# Patient Record
Sex: MALE | Race: WHITE | NOT HISPANIC OR LATINO | Employment: OTHER | URBAN - METROPOLITAN AREA
[De-identification: names, ages, dates, MRNs, and addresses within clinical notes are randomized per-mention and may not be internally consistent; named-entity substitution may affect disease eponyms.]

---

## 2020-02-01 ENCOUNTER — APPOINTMENT (EMERGENCY)
Dept: CT IMAGING | Facility: HOSPITAL | Age: 80
DRG: 640 | End: 2020-02-01
Payer: MEDICARE

## 2020-02-01 ENCOUNTER — HOSPITAL ENCOUNTER (INPATIENT)
Facility: HOSPITAL | Age: 80
LOS: 3 days | Discharge: HOME | DRG: 640 | End: 2020-02-05
Attending: EMERGENCY MEDICINE | Admitting: INTERNAL MEDICINE
Payer: MEDICARE

## 2020-02-01 ENCOUNTER — APPOINTMENT (EMERGENCY)
Dept: RADIOLOGY | Facility: HOSPITAL | Age: 80
DRG: 640 | End: 2020-02-01
Payer: MEDICARE

## 2020-02-01 DIAGNOSIS — E87.6 HYPOKALEMIA: ICD-10-CM

## 2020-02-01 DIAGNOSIS — R60.9 EDEMA: ICD-10-CM

## 2020-02-01 DIAGNOSIS — N18.9 ACUTE-ON-CHRONIC KIDNEY INJURY (HCC): ICD-10-CM

## 2020-02-01 DIAGNOSIS — N17.9 ACUTE-ON-CHRONIC KIDNEY INJURY (HCC): ICD-10-CM

## 2020-02-01 DIAGNOSIS — R53.1 WEAKNESS: ICD-10-CM

## 2020-02-01 DIAGNOSIS — I10 HYPERTENSION: Chronic | ICD-10-CM

## 2020-02-01 DIAGNOSIS — E87.1 HYPONATREMIA: Primary | ICD-10-CM

## 2020-02-01 LAB
ALBUMIN SERPL BCP-MCNC: 3.4 G/DL (ref 3.5–5)
ALP SERPL-CCNC: 163 U/L (ref 46–116)
ALT SERPL W P-5'-P-CCNC: 30 U/L (ref 12–78)
ANION GAP SERPL CALCULATED.3IONS-SCNC: 10 MMOL/L (ref 4–13)
APTT PPP: 54 SECONDS (ref 23–37)
AST SERPL W P-5'-P-CCNC: 28 U/L (ref 5–45)
BASE EX.OXY STD BLDV CALC-SCNC: 70.3 % (ref 60–80)
BASE EXCESS BLDV CALC-SCNC: 0.1 MMOL/L
BASOPHILS # BLD AUTO: 0.04 THOUSANDS/ΜL (ref 0–0.1)
BASOPHILS NFR BLD AUTO: 1 % (ref 0–1)
BETA-HYDROXYBUTYRATE: 0.8 MMOL/L
BILIRUB SERPL-MCNC: 1.18 MG/DL (ref 0.2–1)
BUN SERPL-MCNC: 28 MG/DL (ref 5–25)
CALCIUM SERPL-MCNC: 9.4 MG/DL (ref 8.3–10.1)
CHLORIDE SERPL-SCNC: 81 MMOL/L (ref 100–108)
CK MB SERPL-MCNC: 2 NG/ML (ref 0–5)
CK MB SERPL-MCNC: <1 % (ref 0–2.5)
CK SERPL-CCNC: 244 U/L (ref 39–308)
CO2 SERPL-SCNC: 25 MMOL/L (ref 21–32)
CREAT SERPL-MCNC: 1.81 MG/DL (ref 0.6–1.3)
EOSINOPHIL # BLD AUTO: 0.17 THOUSAND/ΜL (ref 0–0.61)
EOSINOPHIL NFR BLD AUTO: 3 % (ref 0–6)
ERYTHROCYTE [DISTWIDTH] IN BLOOD BY AUTOMATED COUNT: 13 % (ref 11.6–15.1)
GFR SERPL CREATININE-BSD FRML MDRD: 35 ML/MIN/1.73SQ M
GLUCOSE SERPL-MCNC: 233 MG/DL (ref 65–140)
GLUCOSE SERPL-MCNC: 240 MG/DL (ref 65–140)
HCO3 BLDV-SCNC: 25.4 MMOL/L (ref 24–30)
HCT VFR BLD AUTO: 33.8 % (ref 36.5–49.3)
HGB BLD-MCNC: 12 G/DL (ref 12–17)
IMM GRANULOCYTES # BLD AUTO: 0.05 THOUSAND/UL (ref 0–0.2)
IMM GRANULOCYTES NFR BLD AUTO: 1 % (ref 0–2)
INR PPP: 2.08 (ref 0.84–1.19)
LYMPHOCYTES # BLD AUTO: 1.31 THOUSANDS/ΜL (ref 0.6–4.47)
LYMPHOCYTES NFR BLD AUTO: 20 % (ref 14–44)
MCH RBC QN AUTO: 32.7 PG (ref 26.8–34.3)
MCHC RBC AUTO-ENTMCNC: 35.5 G/DL (ref 31.4–37.4)
MCV RBC AUTO: 92 FL (ref 82–98)
MONOCYTES # BLD AUTO: 0.72 THOUSAND/ΜL (ref 0.17–1.22)
MONOCYTES NFR BLD AUTO: 11 % (ref 4–12)
NEUTROPHILS # BLD AUTO: 4.44 THOUSANDS/ΜL (ref 1.85–7.62)
NEUTS SEG NFR BLD AUTO: 64 % (ref 43–75)
NRBC BLD AUTO-RTO: 0 /100 WBCS
O2 CT BLDV-SCNC: 12.1 ML/DL
PCO2 BLDV: 43.9 MM HG (ref 42–50)
PH BLDV: 7.38 [PH] (ref 7.3–7.4)
PLATELET # BLD AUTO: 244 THOUSANDS/UL (ref 149–390)
PMV BLD AUTO: 9.1 FL (ref 8.9–12.7)
PO2 BLDV: 38.5 MM HG (ref 35–45)
POTASSIUM SERPL-SCNC: 3.2 MMOL/L (ref 3.5–5.3)
PROT SERPL-MCNC: 7.6 G/DL (ref 6.4–8.2)
PROTHROMBIN TIME: 22.5 SECONDS (ref 11.6–14.5)
RBC # BLD AUTO: 3.67 MILLION/UL (ref 3.88–5.62)
SODIUM SERPL-SCNC: 116 MMOL/L (ref 136–145)
TROPONIN I SERPL-MCNC: 0.02 NG/ML
TSH SERPL DL<=0.05 MIU/L-ACNC: 1.67 UIU/ML (ref 0.36–3.74)
WBC # BLD AUTO: 6.73 THOUSAND/UL (ref 4.31–10.16)

## 2020-02-01 PROCEDURE — 84443 ASSAY THYROID STIM HORMONE: CPT | Performed by: EMERGENCY MEDICINE

## 2020-02-01 PROCEDURE — 1123F ACP DISCUSS/DSCN MKR DOCD: CPT | Performed by: INTERNAL MEDICINE

## 2020-02-01 PROCEDURE — 71046 X-RAY EXAM CHEST 2 VIEWS: CPT

## 2020-02-01 PROCEDURE — 84484 ASSAY OF TROPONIN QUANT: CPT | Performed by: EMERGENCY MEDICINE

## 2020-02-01 PROCEDURE — 70450 CT HEAD/BRAIN W/O DYE: CPT

## 2020-02-01 PROCEDURE — 82948 REAGENT STRIP/BLOOD GLUCOSE: CPT

## 2020-02-01 PROCEDURE — 82805 BLOOD GASES W/O2 SATURATION: CPT | Performed by: EMERGENCY MEDICINE

## 2020-02-01 PROCEDURE — 80053 COMPREHEN METABOLIC PANEL: CPT | Performed by: EMERGENCY MEDICINE

## 2020-02-01 PROCEDURE — 85610 PROTHROMBIN TIME: CPT | Performed by: EMERGENCY MEDICINE

## 2020-02-01 PROCEDURE — 36415 COLL VENOUS BLD VENIPUNCTURE: CPT | Performed by: EMERGENCY MEDICINE

## 2020-02-01 PROCEDURE — 96360 HYDRATION IV INFUSION INIT: CPT

## 2020-02-01 PROCEDURE — 85025 COMPLETE CBC W/AUTO DIFF WBC: CPT | Performed by: EMERGENCY MEDICINE

## 2020-02-01 PROCEDURE — 82550 ASSAY OF CK (CPK): CPT | Performed by: EMERGENCY MEDICINE

## 2020-02-01 PROCEDURE — 82010 KETONE BODYS QUAN: CPT | Performed by: EMERGENCY MEDICINE

## 2020-02-01 PROCEDURE — 99291 CRITICAL CARE FIRST HOUR: CPT | Performed by: EMERGENCY MEDICINE

## 2020-02-01 PROCEDURE — 82553 CREATINE MB FRACTION: CPT | Performed by: EMERGENCY MEDICINE

## 2020-02-01 PROCEDURE — 85730 THROMBOPLASTIN TIME PARTIAL: CPT | Performed by: EMERGENCY MEDICINE

## 2020-02-01 PROCEDURE — 93005 ELECTROCARDIOGRAM TRACING: CPT

## 2020-02-01 PROCEDURE — 99285 EMERGENCY DEPT VISIT HI MDM: CPT

## 2020-02-01 RX ORDER — POTASSIUM CHLORIDE 20 MEQ/1
20 TABLET, EXTENDED RELEASE ORAL ONCE
Status: COMPLETED | OUTPATIENT
Start: 2020-02-01 | End: 2020-02-01

## 2020-02-01 RX ORDER — SODIUM CHLORIDE 9 MG/ML
125 INJECTION, SOLUTION INTRAVENOUS CONTINUOUS
Status: DISCONTINUED | OUTPATIENT
Start: 2020-02-01 | End: 2020-02-02

## 2020-02-01 RX ADMIN — POTASSIUM CHLORIDE 20 MEQ: 1500 TABLET, EXTENDED RELEASE ORAL at 23:36

## 2020-02-01 RX ADMIN — SODIUM CHLORIDE 250 ML: 0.9 INJECTION, SOLUTION INTRAVENOUS at 23:20

## 2020-02-01 RX ADMIN — SODIUM CHLORIDE 125 ML/HR: 0.9 INJECTION, SOLUTION INTRAVENOUS at 23:41

## 2020-02-02 PROBLEM — R53.1 GENERAL WEAKNESS: Status: ACTIVE | Noted: 2020-02-02

## 2020-02-02 PROBLEM — I48.91 ATRIAL FIBRILLATION (HCC): Chronic | Status: ACTIVE | Noted: 2020-02-02

## 2020-02-02 PROBLEM — N18.9 CKD (CHRONIC KIDNEY DISEASE): Chronic | Status: ACTIVE | Noted: 2020-02-02

## 2020-02-02 PROBLEM — E78.5 HYPERLIPEMIA: Chronic | Status: ACTIVE | Noted: 2020-02-02

## 2020-02-02 PROBLEM — E11.9 DM (DIABETES MELLITUS), TYPE 2 (HCC): Chronic | Status: ACTIVE | Noted: 2020-02-02

## 2020-02-02 PROBLEM — I34.0 MITRAL REGURGITATION: Chronic | Status: ACTIVE | Noted: 2020-02-02

## 2020-02-02 PROBLEM — I10 HYPERTENSION: Chronic | Status: ACTIVE | Noted: 2020-02-02

## 2020-02-02 PROBLEM — I25.10 CAD (CORONARY ARTERY DISEASE): Status: ACTIVE | Noted: 2020-02-02

## 2020-02-02 PROBLEM — E87.1 HYPONATREMIA: Status: ACTIVE | Noted: 2020-02-02

## 2020-02-02 PROBLEM — N18.9 CKD (CHRONIC KIDNEY DISEASE): Status: ACTIVE | Noted: 2020-02-02

## 2020-02-02 LAB
ANION GAP SERPL CALCULATED.3IONS-SCNC: 10 MMOL/L (ref 4–13)
ANION GAP SERPL CALCULATED.3IONS-SCNC: 10 MMOL/L (ref 4–13)
ANION GAP SERPL CALCULATED.3IONS-SCNC: 7 MMOL/L (ref 4–13)
ANION GAP SERPL CALCULATED.3IONS-SCNC: 8 MMOL/L (ref 4–13)
ANION GAP SERPL CALCULATED.3IONS-SCNC: 8 MMOL/L (ref 4–13)
ATRIAL RATE: 82 BPM
ATRIAL RATE: 89 BPM
BACTERIA UR QL AUTO: ABNORMAL /HPF
BILIRUB UR QL STRIP: NEGATIVE
BUN SERPL-MCNC: 25 MG/DL (ref 5–25)
BUN SERPL-MCNC: 26 MG/DL (ref 5–25)
CALCIUM SERPL-MCNC: 8.7 MG/DL (ref 8.3–10.1)
CALCIUM SERPL-MCNC: 8.8 MG/DL (ref 8.3–10.1)
CALCIUM SERPL-MCNC: 9.1 MG/DL (ref 8.3–10.1)
CALCIUM SERPL-MCNC: 9.2 MG/DL (ref 8.3–10.1)
CALCIUM SERPL-MCNC: 9.6 MG/DL (ref 8.3–10.1)
CHLORIDE SERPL-SCNC: 82 MMOL/L (ref 100–108)
CHLORIDE SERPL-SCNC: 84 MMOL/L (ref 100–108)
CHLORIDE SERPL-SCNC: 85 MMOL/L (ref 100–108)
CLARITY UR: CLEAR
CO2 SERPL-SCNC: 26 MMOL/L (ref 21–32)
CO2 SERPL-SCNC: 26 MMOL/L (ref 21–32)
CO2 SERPL-SCNC: 27 MMOL/L (ref 21–32)
CO2 SERPL-SCNC: 27 MMOL/L (ref 21–32)
CO2 SERPL-SCNC: 28 MMOL/L (ref 21–32)
COLOR UR: YELLOW
CREAT SERPL-MCNC: 1.62 MG/DL (ref 0.6–1.3)
CREAT SERPL-MCNC: 1.65 MG/DL (ref 0.6–1.3)
CREAT SERPL-MCNC: 1.65 MG/DL (ref 0.6–1.3)
CREAT SERPL-MCNC: 1.69 MG/DL (ref 0.6–1.3)
CREAT SERPL-MCNC: 1.77 MG/DL (ref 0.6–1.3)
GFR SERPL CREATININE-BSD FRML MDRD: 35 ML/MIN/1.73SQ M
GFR SERPL CREATININE-BSD FRML MDRD: 38 ML/MIN/1.73SQ M
GFR SERPL CREATININE-BSD FRML MDRD: 39 ML/MIN/1.73SQ M
GLUCOSE SERPL-MCNC: 151 MG/DL (ref 65–140)
GLUCOSE SERPL-MCNC: 156 MG/DL (ref 65–140)
GLUCOSE SERPL-MCNC: 163 MG/DL (ref 65–140)
GLUCOSE SERPL-MCNC: 173 MG/DL (ref 65–140)
GLUCOSE SERPL-MCNC: 174 MG/DL (ref 65–140)
GLUCOSE SERPL-MCNC: 176 MG/DL (ref 65–140)
GLUCOSE SERPL-MCNC: 203 MG/DL (ref 65–140)
GLUCOSE SERPL-MCNC: 226 MG/DL (ref 65–140)
GLUCOSE SERPL-MCNC: 246 MG/DL (ref 65–140)
GLUCOSE SERPL-MCNC: 258 MG/DL (ref 65–140)
GLUCOSE UR STRIP-MCNC: ABNORMAL MG/DL
HGB UR QL STRIP.AUTO: ABNORMAL
KETONES UR STRIP-MCNC: NEGATIVE MG/DL
LEUKOCYTE ESTERASE UR QL STRIP: NEGATIVE
NITRITE UR QL STRIP: NEGATIVE
NON-SQ EPI CELLS URNS QL MICRO: ABNORMAL /HPF
NT-PROBNP SERPL-MCNC: 8433 PG/ML
OSMOLALITY UR/SERPL-RTO: 259 MMOL/KG (ref 282–298)
OSMOLALITY UR: 395 MMOL/KG
PH UR STRIP.AUTO: 7 [PH] (ref 4.5–8)
PLATELET # BLD AUTO: 236 THOUSANDS/UL (ref 149–390)
PMV BLD AUTO: 8.6 FL (ref 8.9–12.7)
POTASSIUM SERPL-SCNC: 3.5 MMOL/L (ref 3.5–5.3)
POTASSIUM SERPL-SCNC: 3.5 MMOL/L (ref 3.5–5.3)
POTASSIUM SERPL-SCNC: 3.7 MMOL/L (ref 3.5–5.3)
POTASSIUM SERPL-SCNC: 3.8 MMOL/L (ref 3.5–5.3)
POTASSIUM SERPL-SCNC: 3.9 MMOL/L (ref 3.5–5.3)
PROT UR STRIP-MCNC: >=300 MG/DL
QRS AXIS: -79 DEGREES
QRS AXIS: -79 DEGREES
QRSD INTERVAL: 166 MS
QRSD INTERVAL: 172 MS
QT INTERVAL: 436 MS
QT INTERVAL: 450 MS
QTC INTERVAL: 516 MS
QTC INTERVAL: 528 MS
RBC #/AREA URNS AUTO: ABNORMAL /HPF
SODIUM 24H UR-SCNC: 83 MOL/L
SODIUM SERPL-SCNC: 116 MMOL/L (ref 136–145)
SODIUM SERPL-SCNC: 119 MMOL/L (ref 136–145)
SODIUM SERPL-SCNC: 119 MMOL/L (ref 136–145)
SODIUM SERPL-SCNC: 121 MMOL/L (ref 136–145)
SODIUM SERPL-SCNC: 123 MMOL/L (ref 136–145)
SP GR UR STRIP.AUTO: 1.02 (ref 1–1.03)
T WAVE AXIS: 94 DEGREES
T WAVE AXIS: 95 DEGREES
UROBILINOGEN UR QL STRIP.AUTO: 1 E.U./DL
VENTRICULAR RATE: 79 BPM
VENTRICULAR RATE: 88 BPM
WBC #/AREA URNS AUTO: ABNORMAL /HPF

## 2020-02-02 PROCEDURE — 99292 CRITICAL CARE ADDL 30 MIN: CPT | Performed by: INTERNAL MEDICINE

## 2020-02-02 PROCEDURE — 96361 HYDRATE IV INFUSION ADD-ON: CPT

## 2020-02-02 PROCEDURE — 81001 URINALYSIS AUTO W/SCOPE: CPT

## 2020-02-02 PROCEDURE — 99223 1ST HOSP IP/OBS HIGH 75: CPT | Performed by: INTERNAL MEDICINE

## 2020-02-02 PROCEDURE — 83935 ASSAY OF URINE OSMOLALITY: CPT | Performed by: PHYSICIAN ASSISTANT

## 2020-02-02 PROCEDURE — 82948 REAGENT STRIP/BLOOD GLUCOSE: CPT

## 2020-02-02 PROCEDURE — 99291 CRITICAL CARE FIRST HOUR: CPT | Performed by: PHYSICIAN ASSISTANT

## 2020-02-02 PROCEDURE — 85049 AUTOMATED PLATELET COUNT: CPT | Performed by: PHYSICIAN ASSISTANT

## 2020-02-02 PROCEDURE — 80048 BASIC METABOLIC PNL TOTAL CA: CPT | Performed by: PHYSICIAN ASSISTANT

## 2020-02-02 PROCEDURE — 83930 ASSAY OF BLOOD OSMOLALITY: CPT | Performed by: PHYSICIAN ASSISTANT

## 2020-02-02 PROCEDURE — 84300 ASSAY OF URINE SODIUM: CPT | Performed by: EMERGENCY MEDICINE

## 2020-02-02 PROCEDURE — 93010 ELECTROCARDIOGRAM REPORT: CPT | Performed by: INTERNAL MEDICINE

## 2020-02-02 PROCEDURE — 83880 ASSAY OF NATRIURETIC PEPTIDE: CPT | Performed by: PHYSICIAN ASSISTANT

## 2020-02-02 RX ORDER — CHLORTHALIDONE 25 MG/1
25 TABLET ORAL DAILY
COMMUNITY
End: 2020-02-05 | Stop reason: HOSPADM

## 2020-02-02 RX ORDER — METOPROLOL TARTRATE 100 MG/1
50 TABLET ORAL 2 TIMES DAILY
COMMUNITY
Start: 2019-02-28

## 2020-02-02 RX ORDER — FUROSEMIDE 10 MG/ML
40 INJECTION INTRAMUSCULAR; INTRAVENOUS ONCE
Status: COMPLETED | OUTPATIENT
Start: 2020-02-02 | End: 2020-02-02

## 2020-02-02 RX ORDER — 3% SODIUM CHLORIDE 3 G/100ML
30 INJECTION, SOLUTION INTRAVENOUS CONTINUOUS
Status: DISCONTINUED | OUTPATIENT
Start: 2020-02-02 | End: 2020-02-02

## 2020-02-02 RX ORDER — WARFARIN SODIUM 2.5 MG/1
TABLET ORAL
COMMUNITY
Start: 2018-03-26

## 2020-02-02 RX ORDER — METOPROLOL TARTRATE 100 MG/1
100 TABLET ORAL EVERY 12 HOURS SCHEDULED
Status: DISCONTINUED | OUTPATIENT
Start: 2020-02-02 | End: 2020-02-05 | Stop reason: HOSPADM

## 2020-02-02 RX ORDER — FLUTICASONE PROPIONATE 50 MCG
2 SPRAY, SUSPENSION (ML) NASAL DAILY
COMMUNITY
Start: 2020-01-25 | End: 2021-01-24

## 2020-02-02 RX ORDER — LANOLIN ALCOHOL/MO/W.PET/CERES
3 CREAM (GRAM) TOPICAL
Status: DISCONTINUED | OUTPATIENT
Start: 2020-02-02 | End: 2020-02-05 | Stop reason: HOSPADM

## 2020-02-02 RX ORDER — AZITHROMYCIN 250 MG/1
TABLET, FILM COATED ORAL
COMMUNITY
Start: 2020-01-29 | End: 2020-02-05 | Stop reason: HOSPADM

## 2020-02-02 RX ORDER — ATORVASTATIN CALCIUM 40 MG/1
40 TABLET, FILM COATED ORAL DAILY
COMMUNITY
Start: 2019-08-05

## 2020-02-02 RX ORDER — NIACIN 500 MG/1
TABLET, EXTENDED RELEASE ORAL
COMMUNITY
Start: 2017-11-28

## 2020-02-02 RX ORDER — NITROGLYCERIN 0.4 MG/1
0.4 TABLET SUBLINGUAL
COMMUNITY
Start: 2018-07-02

## 2020-02-02 RX ORDER — POTASSIUM CHLORIDE 20 MEQ/1
40 TABLET, EXTENDED RELEASE ORAL ONCE
Status: COMPLETED | OUTPATIENT
Start: 2020-02-02 | End: 2020-02-02

## 2020-02-02 RX ORDER — LOSARTAN POTASSIUM 100 MG/1
100 TABLET ORAL DAILY
Status: ON HOLD | COMMUNITY
Start: 2017-01-23 | End: 2020-02-05 | Stop reason: SDUPTHER

## 2020-02-02 RX ORDER — CHLORHEXIDINE GLUCONATE 0.12 MG/ML
15 RINSE ORAL EVERY 12 HOURS SCHEDULED
Status: DISCONTINUED | OUTPATIENT
Start: 2020-02-02 | End: 2020-02-03

## 2020-02-02 RX ORDER — AMLODIPINE BESYLATE 5 MG/1
5 TABLET ORAL DAILY
Status: DISCONTINUED | OUTPATIENT
Start: 2020-02-02 | End: 2020-02-05 | Stop reason: HOSPADM

## 2020-02-02 RX ORDER — HEPARIN SODIUM 5000 [USP'U]/ML
5000 INJECTION, SOLUTION INTRAVENOUS; SUBCUTANEOUS EVERY 8 HOURS SCHEDULED
Status: DISCONTINUED | OUTPATIENT
Start: 2020-02-02 | End: 2020-02-05

## 2020-02-02 RX ORDER — INSULIN LISPRO 100 [IU]/ML
INJECTION, SOLUTION INTRAVENOUS; SUBCUTANEOUS
COMMUNITY
Start: 2020-01-20

## 2020-02-02 RX ADMIN — METOPROLOL TARTRATE 100 MG: 100 TABLET, FILM COATED ORAL at 21:01

## 2020-02-02 RX ADMIN — CHLORHEXIDINE GLUCONATE 0.12% ORAL RINSE 15 ML: 1.2 LIQUID ORAL at 21:01

## 2020-02-02 RX ADMIN — INSULIN LISPRO 1 UNITS: 100 INJECTION, SOLUTION INTRAVENOUS; SUBCUTANEOUS at 09:08

## 2020-02-02 RX ADMIN — AMLODIPINE BESYLATE 5 MG: 5 TABLET ORAL at 09:09

## 2020-02-02 RX ADMIN — CHLORHEXIDINE GLUCONATE 0.12% ORAL RINSE 15 ML: 1.2 LIQUID ORAL at 09:09

## 2020-02-02 RX ADMIN — METOPROLOL TARTRATE 100 MG: 100 TABLET, FILM COATED ORAL at 03:33

## 2020-02-02 RX ADMIN — SODIUM CHLORIDE: 234 INJECTION INTRAMUSCULAR; INTRAVENOUS; SUBCUTANEOUS at 18:29

## 2020-02-02 RX ADMIN — HEPARIN SODIUM 5000 UNITS: 5000 INJECTION INTRAVENOUS; SUBCUTANEOUS at 05:29

## 2020-02-02 RX ADMIN — SODIUM CHLORIDE 30 ML/HR: 3 INJECTION, SOLUTION INTRAVENOUS at 04:42

## 2020-02-02 RX ADMIN — POTASSIUM CHLORIDE 40 MEQ: 1500 TABLET, EXTENDED RELEASE ORAL at 21:35

## 2020-02-02 RX ADMIN — FUROSEMIDE 40 MG: 10 INJECTION, SOLUTION INTRAMUSCULAR; INTRAVENOUS at 21:35

## 2020-02-02 RX ADMIN — INSULIN LISPRO 3 UNITS: 100 INJECTION, SOLUTION INTRAVENOUS; SUBCUTANEOUS at 21:01

## 2020-02-02 RX ADMIN — HEPARIN SODIUM 5000 UNITS: 5000 INJECTION INTRAVENOUS; SUBCUTANEOUS at 21:01

## 2020-02-02 RX ADMIN — INSULIN LISPRO 1 UNITS: 100 INJECTION, SOLUTION INTRAVENOUS; SUBCUTANEOUS at 17:26

## 2020-02-02 RX ADMIN — CHLORHEXIDINE GLUCONATE 0.12% ORAL RINSE 15 ML: 1.2 LIQUID ORAL at 03:33

## 2020-02-02 RX ADMIN — POTASSIUM CHLORIDE 40 MEQ: 1500 TABLET, EXTENDED RELEASE ORAL at 03:33

## 2020-02-02 RX ADMIN — FUROSEMIDE 40 MG: 10 INJECTION, SOLUTION INTRAMUSCULAR; INTRAVENOUS at 04:08

## 2020-02-02 RX ADMIN — INSULIN LISPRO 2 UNITS: 100 INJECTION, SOLUTION INTRAVENOUS; SUBCUTANEOUS at 12:06

## 2020-02-02 RX ADMIN — MELATONIN 3 MG: at 21:01

## 2020-02-02 RX ADMIN — HEPARIN SODIUM 5000 UNITS: 5000 INJECTION INTRAVENOUS; SUBCUTANEOUS at 13:37

## 2020-02-02 NOTE — H&P
H&P Exam - [address] [facility] [de-identified] y o  male MRN: [id number]  Unit/Bed#: ED 05 Encounter: [id number]      -------------------------------------------------------------------------------------------------------------  Chief Complaint: Worsening generalized weakness and weakness in legs    History of Present Illness     Amita Narayan is a [de-identified] y o  male with a PMH of DM2, CKD, HTN, CAD, afib on coumadin, mitral regurgitation that presented with worsening generalized weakness and weakness in his leg that he said became increasingly worse today  He stated that he recently began drinking more water  He drank 3-16oz bottles of water today which is more that he had previously  He denies visual changes, headaches, N/V/D, abdominal pain, chest pain, palpitation  He states that his hands are more swollen than normal and his daughter states that his hands and face are more swollen today than normal      History obtained from chart review and the patient   -------------------------------------------------------------------------------------------------------------  Assessment and Plan:    Neuro:    Diagnosis: Risk seizure secondary to hyponatremia  o Plan: Continue to monitor neurologic exam   Diagnosis: Monitor for ICU delirium  o Plan: CAM ICU  o Encourage sleep wake cycle       CV:    Diagnosis: Chronic atrial fibrillation on coumadin  o Plan: INR 2 08  Will need to restart coumadin   o Continue home metoprolol for rate control  He take 100mg BID   Diagnosis: Chronic HTN   o Plan: Continue home amolodipine  He takes 5mg daily   o He takes chlorthalidone at home  Will hold secondary to risk of hyponatremia with thiazide diuretics   The patient does appear volume overload but will treat will lasix    Diagnosis: CAD  o Plan: Continue ASA   Diagnosis: Mitral regurgitation   o Plan: Patient was to have NINI this month     Pulm:   Diagnosis: No acute issues  o Plan: Continue to encourage IS and pulmonary exercises  o Monitor on pulse oximetry  o Supplemental 02 as needed  o Goal Sp02>90%    GI:    Diagnosis: No acute issues      :    Diagnosis: CKD  o Plan: Baseline creatine 2-2 2  o Current creatine 1 8  o Continue to monitor I/O      F/E/N:    Diagnosis: Acute hyponatremia likely secondary to hypervolemia   o Plan: Patient received 250cc bolus of NS in the ED  o Will check NA now  o Check urine Na and osmolarity from previously collected sample in the ED  o TSH normal  o Will plan to give 40mg of lasix and monitor response   o Start 3% HTS at 30cc/hr  o Check Q4 Na  o Fluid restrict to 1L for now   Diagnosis: Hypokalemia  o Plan: Continue to trend  o Will give 40meq KCL    Heme/Onc:    Diagnosis: No acute issues    Endo:    Diagnosis: DM2  o Plan: Start ISS  o Hold home Levemir  Patient states that he was hypoglycemic earlier today because he had not been eating    o Will monitor for hypoglycemia     ID:    Diagnosis: No evidence of acute infection       MSK/Skin:    Diagnosis: Generalized weakness secondary to hyponatremia   o Plan: Ambulate       Disposition: Admit to Stepdown Level 1  Code Status: Level 1 - Full Code  --------------------------------------------------------------------------------------------------------------  Review of Systems    A 12-point, complete review of systems was reviewed and negative except as stated above     Physical Exam   Constitutional: He is oriented to person, place, and time  Elderly male lying in bed in no acute distress   HENT:   Periorbital edema   Eyes: Pupils are equal, round, and reactive to light  EOM are normal    Neck: Neck supple  Cardiovascular: Normal rate  An irregular rhythm present  2+ pretibial edema BL  BL hand edema   Pulmonary/Chest: Effort normal and breath sounds normal  No respiratory distress  Abdominal: Soft  He exhibits no distension  Musculoskeletal: Normal range of motion     Neurological: He is alert and oriented to person, place, and time  Skin: Skin is warm and dry  Capillary refill takes less than 2 seconds  Vitals reviewed  --------------------------------------------------------------------------------------------------------------  Vitals:   Vitals:    02/01/20 2154 02/01/20 2330 02/02/20 0030   BP: 162/82 152/78 (!) 171/88   BP Location: Right arm Left arm Left arm   Pulse: 64 70 74   Resp: 16 18 18   Temp: (!) 97 4 °F (36 3 °C)     TempSrc: Oral     SpO2: 98% 97% 97%   Weight: 95 2 kg (209 lb 14 1 oz)       Temp  Min: 97 4 °F (36 3 °C)  Max: 97 4 °F (36 3 °C)  IBW: -88 kg     There is no height or weight on file to calculate BMI  N/A    Laboratory and Diagnostics:  Results from last 7 days   Lab Units 02/01/20 2218   WBC Thousand/uL 6 73   HEMOGLOBIN g/dL 12 0   HEMATOCRIT % 33 8*   PLATELETS Thousands/uL 244   NEUTROS PCT % 64   MONOS PCT % 11     Results from last 7 days   Lab Units 02/01/20 2218   SODIUM mmol/L 116*   POTASSIUM mmol/L 3 2*   CHLORIDE mmol/L 81*   CO2 mmol/L 25   ANION GAP mmol/L 10   BUN mg/dL 28*   CREATININE mg/dL 1 81*   CALCIUM mg/dL 9 4   GLUCOSE RANDOM mg/dL 240*   ALT U/L 30   AST U/L 28   ALK PHOS U/L 163*   ALBUMIN g/dL 3 4*   TOTAL BILIRUBIN mg/dL 1 18*          Results from last 7 days   Lab Units 02/01/20 2218   INR  2 08*   PTT seconds 54*      Results from last 7 days   Lab Units 02/01/20 2218   TROPONIN I ng/mL 0 02         ABG:    VBG:  Results from last 7 days   Lab Units 02/01/20  2319   PH MIKE  7 380   PCO2 MIKE mm Hg 43 9   PO2 MIKE mm Hg 38 5   HCO3 MIKE mmol/L 25 4   BASE EXC MIKE mmol/L 0 1           Micro:        EKG: afib  Imaging: CT head imaging reviewed  No acute ICH or stroke   CXR shows evidence of volume overload per my reads    Historical Information   Past Medical History:   Diagnosis Date    Diabetes mellitus ([facility] [address] )     Hyperlipidemia     Hypertension      Past Surgical History:   Procedure Laterality Date    APPENDECTOMY      CARDIAC SURGERY       Social History Social History     Substance and Sexual Activity   Alcohol Use Not on file     Social History     Substance and Sexual Activity   Drug Use Not on file     Social History     Tobacco Use   Smoking Status Former Smoker    Types: Cigarettes    Last attempt to quit: 2000    Years since quittin 1   Smokeless Tobacco Never Used     Exercise History: ambulates at home  Family History:   History reviewed  No pertinent family history  I have reviewed this patient's family history and commented on sigificant items within the HPI      Medications:  Current Facility-Administered Medications   Medication Dose Route Frequency    chlorhexidine (PERIDEX) 0 12 % oral rinse 15 mL  15 mL Swish & Spit Q12H [facility]    heparin (porcine) subcutaneous injection 5,000 Units  5,000 Units Subcutaneous Q8H [facility]     Home medications:  None     Allergies:  No Known Allergies  ------------------------------------------------------------------------------------------------------------  Advance Directive and Living Will:      Power of :    POLST:    ------------------------------------------------------------------------------------------------------------  Anticipated Length of Stay is > 2 midnights    Counseling / Coordination of Care  Total Critical Care time spent 35 minutes excluding procedures, teaching and family updates  Tee Ngo PA-C        Portions of the record may have been created with voice recognition software  Occasional wrong word or "sound a like" substitutions may have occurred due to the inherent limitations of voice recognition software    Read the chart carefully and recognize, using context, where substitutions have occurred

## 2020-02-02 NOTE — CONSULTS
[address] [facility]oz 89 [de-identified] y o  male MRN: [id number]  Unit/Bed#: ICU 07 Encounter: [id number]    ASSESSMENT and PLAN:  1  Hyponatremia:  · Admission Na of 116 at 2218 on 2/1/20 and admitted to ICU  · He was treated with 3% saline and his Na this AM at 0745 was 123  · The change in the Na level is already at goal and I would recommend stopping the hypertonic saline  · For today, I would aim to keep his sodium level between 122 and 125  · Continue with BMP q4h  · Continue with fluid restriction 1000 cc/24 hours  · TSH is 1 674  SOsm 259  UOsm 395, [place] [address]  · Check cortisol level and uric acid  · Etiology is not entirely clear - He was on chlorthalidone and was drinking a higher than usual amount of water  Possibly fluid overload is playing a role? 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 although these are not entirely reliable due to the intake of thiazides  2  Chronic kidney disease, stage IV:  · Baseline serum creatinine is around 2 0-2 3  · Followed by Dr Uyen Carter of [address]  · Renal function is stable  3  Hypertension:  · BP is controlled  · Continue amlodipine and metoprolol    4  Edema:  · Continue to monitor for now  · Will eventually require loop diuretics but would hold off on it until Na is more stable  5  Hypokalemia:  · Improved  SUMMARY OF RECOMMENDATIONS:  · Stop hypertonic saline (discussed with medical resident earlier and already done)  · Continue to check BMP q4H  · Aim to keep Na between 122 and 125 through today  · Check cortisol and uric acid  · Continue fluid restriction  · Hold off on further diuretics until we know that Na is stable  The above plan was discussed with critical care  Previous records were personally reviewed by me to obtain a baseline creatinine  The images (CXR) were personally reviewed by me in PACS - no acute findings       HISTORY OF PRESENT ILLNESS:  Requesting Physician: Jarett Wills MD  Reason for Consult: Hyponatremia    Pratima Howard is a [de-identified] y o  male who was admitted to [facility] on 2/2/20 after presenting with weakness  A renal consultation is requested today for assistance in the management of hyponatremia  Parts of the information in this consult was obtained from the family as the patient is a poor historian  Wyelena Brown has a history of hypertension, diabetes mellitus, hyperlipidemia, chronic kidney disease, coronary artery disease, atrial fibrillation, hyperuricemia  He has known chronic kidney disease and follows with Dr Basim Faye of [address] [facility]  Based on my personal review of the records, I note that his baseline serum creatinine seems to be around 2 0-2 3  His most recent blood work as an outpatient was on December 10, 2019 which revealed a creatinine of 2 01 and a sodium level of 134  He now presents to [facility] after complaining of weakness which has been worsening over the past 2 weeks  He reports that he has had some balance issues over the past month but his leg weakness has worsened over the past 2 weeks  He does admit to drinking a little bit more water than usual   Based on my discussion with the family members, they reported the patient has been more forgetful over the past month  They also note that his balance is off but he has not had any falls  Additionally, they report leg, arm, and face swelling  Due to the worsening weakness, the patient came to [facility] and was found to have a serum sodium of 116  He was subsequently admitted to the intensive care unit and was started on 3% hypertonic saline  We are now being consulted for assistance with management of hyponatremia  I was called by the resident this morning about his repeat sodium being 123 and I recommended the discontinuation of hypertonic saline      Van Brown denies any poor oral intake, anorexia, fever, chills, vision changes, headaches, nausea, vomiting, abdominal pain, diarrhea, chest pain, shortness of breath, palpitations, joint pain  He was (+) for forgetfulness, ambulatory dysfunction, swelling, weakness  He reports being on chlorthalidone and the dose of such has not changed over the past few years  PAST MEDICAL HISTORY:  Past Medical History:   Diagnosis Date    Diabetes mellitus ([address] )     Hyperlipidemia     Hypertension      PAST SURGICAL HISTORY:  Past Surgical History:   Procedure Laterality Date    APPENDECTOMY      CARDIAC SURGERY       ALLERGIES:  No Known Allergies    SOCIAL HISTORY:  Social History     Substance and Sexual Activity   Alcohol Use Not on file     Social History     Substance and Sexual Activity   Drug Use Not on file     Social History     Tobacco Use   Smoking Status Former Smoker    Types: Cigarettes    Last attempt to quit:     Years since quittin 1   Smokeless Tobacco Never Used     FAMILY HISTORY:  History reviewed  No pertinent family history      MEDICATIONS:    Current Facility-Administered Medications:     amLODIPine (NORVASC) tablet 5 mg, 5 mg, Oral, Daily, Angella Kaminski PA-C, 5 mg at 20 0909    chlorhexidine (PERIDEX) 0 12 % oral rinse 15 mL, 15 mL, Swish & Spit, Q12H [facility], Angella Kaminski PA-C, 15 mL at 20 0909    heparin (porcine) subcutaneous injection 5,000 Units, 5,000 Units, Subcutaneous, Q8H [facility], 5,000 Units at 20 0529 **AND** [COMPLETED] Platelet count, , , Once, Angella Kaminski PA-C    insulin lispro (HumaLOG) 100 units/mL subcutaneous injection 1-6 Units, 1-6 Units, Subcutaneous, TID AC, 1 Units at 20 0908 **AND** Fingerstick Glucose (POCT), , , TID AC, Jarocho Coy PA-C    insulin lispro (HumaLOG) 100 units/mL subcutaneous injection 1-6 Units, 1-6 Units, Subcutaneous, HS, Angella Kaminski PA-C    metoprolol tartrate (LOPRESSOR) tablet 100 mg, 100 mg, Oral, Q12H [facility], Jarocho Coy PA-C, 100 mg at 20 8095    REVIEW OF SYSTEMS:  All the systems were reviewed and were negative except as documented on the HPI  PHYSICAL EXAM:  Current Weight: Weight - Scale: 93 kg (205 lb 0 4 oz)  First Weight: Weight - Scale: 95 2 kg (209 lb 14 1 oz)  Vitals:    02/02/20 0223 02/02/20 0300 02/02/20 0555 02/02/20 0700   BP: 149/72 144/70  118/71   BP Location: Left arm   Right arm   Pulse: 64 78  79   Resp: 18 (!) 27  14   Temp: (!) 97 4 °F (36 3 °C)   (!) 97 3 °F (36 3 °C)   TempSrc: Oral   Oral   SpO2: 96% 95%  93%   Weight:   93 kg (205 lb 0 4 oz)        Intake/Output Summary (Last 24 hours) at 2/2/2020 1202  Last data filed at 2/2/2020 1118  Gross per 24 hour   Intake 1110 ml   Output 1855 ml   Net -745 ml     Physical Exam  General: conscious, coherent, cooperative, not in distress  Skin: warm, dry, senile turgor  Eyes: pink conjunctivae, no scleral icterus  ENT: dry lips and mucous membranes  Neck: supple, difficult to evaluate JVP  Chest/Lungs: clear breath sounds  No rales, rhonchi, wheeze  CVS: distinct heart sounds, normal rate, regular rhythm  Abdomen: soft, non-tender, non-distended, normoactive bowel sounds  Extremities: (+) LE edema  : deferred   Neuro: awake, alert, forgetful  Psych: appropriate affect        Invasive Devices:      Lab Results:   Results from last 7 days   Lab Units 02/02/20  0745 02/02/20  0254 02/01/20  2218   WBC Thousand/uL  --   --  6 73   HEMOGLOBIN g/dL  --   --  12 0   HEMATOCRIT %  --   --  33 8*   PLATELETS Thousands/uL  --  236 244   POTASSIUM mmol/L 3 7 3 5 3 2*   CHLORIDE mmol/L 85* 82* 81*   CO2 mmol/L 28 26 25   BUN mg/dL 26* 26* 28*   CREATININE mg/dL 1 69* 1 77* 1 81*   CALCIUM mg/dL 8 7 9 6 9 4   ALK PHOS U/L  --   --  163*   ALT U/L  --   --  30   AST U/L  --   --  28     Other Studies:

## 2020-02-02 NOTE — ED PROVIDER NOTES
History  Chief Complaint   Patient presents with    Weakness - Generalized     Pt presents to the ED with generalized weakness  Patient is a [de-identified]year old male with about 1 week of generalized weakness and decreased appetite  No chest pain or sob  No headache  No N/V/D  No urinary sx  No fever  No GI bleeding  Was on zithromax last week for a cough  Is on coumadin for atrial fibrillation  No recent old records from this ED seen on computer system  [facility] website checked on this patient and no Rx found  History provided by:  Patient and relative (son and daughter)   used: No        None       Past Medical History:   Diagnosis Date    Diabetes mellitus ([facility] [address] )     Hyperlipidemia     Hypertension        Past Surgical History:   Procedure Laterality Date    APPENDECTOMY      CARDIAC SURGERY         History reviewed  No pertinent family history  I have reviewed and agree with the history as documented  Social History     Tobacco Use    Smoking status: Former Smoker     Types: Cigarettes     Last attempt to quit: 2000     Years since quittin 1    Smokeless tobacco: Never Used   Substance Use Topics    Alcohol use: Not on file    Drug use: Not on file        Review of Systems   Constitutional: Positive for appetite change and fatigue  Negative for fever  Respiratory: Negative for shortness of breath  Cardiovascular: Negative for chest pain  Gastrointestinal: Negative for blood in stool, diarrhea, nausea and vomiting  Genitourinary: Negative for difficulty urinating  Neurological: Positive for weakness  Negative for headaches  All other systems reviewed and are negative  Physical Exam  Physical Exam   Constitutional: He is oriented to person, place, and time  He appears well-developed and well-nourished  He appears distressed (moderate)  HENT:   Head: Normocephalic and atraumatic  Mucous membranes somewhat dry      Eyes: Pupils are equal, round, and reactive to light  EOM are normal  No scleral icterus  Neck: Normal range of motion  Neck supple  No JVD present  No tracheal deviation present  Cardiovascular: Normal rate  Murmur heard  Irregularly irregular   Pulmonary/Chest: Effort normal and breath sounds normal  No stridor  No respiratory distress  He has no wheezes  He has no rales  Abdominal: Soft  Bowel sounds are normal  There is no tenderness  Musculoskeletal: He exhibits edema (mild bilateral LE edema)  He exhibits no tenderness (No calf tenderness) or deformity  Neurological: He is alert and oriented to person, place, and time  No cranial nerve deficit or sensory deficit  Normal strength bilaterally  Skin: Skin is warm and dry  No rash noted  Psychiatric: He has a normal mood and affect  Nursing note and vitals reviewed        Vital Signs  ED Triage Vitals   Temperature Pulse Respirations Blood Pressure SpO2   02/01/20 2154 02/01/20 2154 02/01/20 2154 02/01/20 2154 02/01/20 2154   (!) 97 4 °F (36 3 °C) 64 16 162/82 98 %      Temp Source Heart Rate Source Patient Position - Orthostatic VS BP Location FiO2 (%)   02/01/20 2154 02/01/20 2154 02/01/20 2330 02/01/20 2154 --   Oral Monitor Sitting Right arm       Pain Score       02/02/20 0030       No Pain           Vitals:    02/01/20 2154 02/01/20 2330 02/02/20 0030   BP: 162/82 152/78 (!) 171/88   Pulse: 64 70 74   Patient Position - Orthostatic VS:  Sitting Sitting         Visual Acuity      ED Medications  Medications   chlorhexidine (PERIDEX) 0 12 % oral rinse 15 mL (has no administration in time range)   heparin (porcine) subcutaneous injection 5,000 Units (has no administration in time range)   sodium chloride 0 9 % bolus 250 mL (0 mL Intravenous Stopped 2/1/20 2341)   potassium chloride (K-DUR,KLOR-CON) CR tablet 20 mEq (20 mEq Oral Given 2/1/20 2336)       Diagnostic Studies  Results Reviewed     Procedure Component Value Units Date/Time    Platelet count [[id number]]     Lab Status:  No result Specimen:  Blood     Sodium, urine, random [[id number]]     Lab Status:  No result Specimen:  Urine     Osmolality, urine [[id number]]     Lab Status:  No result Specimen:  Urine     Basic metabolic panel [[id number]]     Lab Status:  No result Specimen:  Blood     Osmolality-"If this is regarding a toxic alcohol, STOP  Test is not routinely indicated   Please consult medical  on call for further guidance " [[id number]]     Lab Status:  No result Specimen:  Blood     Urine Microscopic [[id number]]  (Abnormal) Collected:  02/02/20 0033    Lab Status:  Final result Specimen:  Urine, Clean Catch Updated:  02/02/20 0047     RBC, UA None Seen /hpf      WBC, UA 0-1 /hpf      Epithelial Cells Occasional /hpf      Bacteria, UA None Seen /hpf     Urine Macroscopic, POC [[id number]]  (Abnormal) Collected:  02/02/20 0033    Lab Status:  Final result Specimen:  Urine Updated:  02/02/20 0028     Color, UA Yellow     Clarity, UA Clear     pH, UA 7 0     Leukocytes, UA Negative     Nitrite, UA Negative     Protein, UA >=300 mg/dl      Glucose,  (1/10%) mg/dl      Ketones, UA Negative mg/dl      Urobilinogen, UA 1 0 E U /dl      Bilirubin, UA Negative     Blood, UA Trace     Specific [place], UA 1 020    Narrative:       CLINITEK RESULT    Beta Hydroxybutyrate [[id number]]  (Abnormal) Collected:  02/01/20 2319    Lab Status:  Final result Specimen:  Blood from Arm, Right Updated:  02/01/20 2332     BETA-HYDROXYBUTYRATE 0 8 mmol/L     Blood gas, venous [[id number]] Collected:  02/01/20 2319    Lab Status:  Final result Specimen:  Blood from Arm, Right Updated:  02/01/20 2329     pH, Martin 7 380     pCO2, Martin 43 9 mm Hg      pO2, Martin 38 5 mm Hg      HCO3, Martin 25 4 mmol/L      Base Excess, Martin 0 1 mmol/L      O2 Content, Martin 12 1 ml/dL      O2 HGB, VENOUS 70 3 %     CKMB [[id number]]  (Normal) Collected:  02/01/20 2218    Lab Status:  Final result Specimen:  Blood from Arm, Right Updated:  02/01/20 2321 CK-MB Index <1 0 %      CK-MB 2 0 ng/mL     Protime-INR [[id number]]  (Abnormal) Collected:  02/01/20 2218    Lab Status:  Final result Specimen:  Blood from Arm, Right Updated:  02/01/20 2305     Protime 22 5 seconds      INR 2 08    APTT [[id number]]  (Abnormal) Collected:  02/01/20 2218    Lab Status:  Final result Specimen:  Blood from Arm, Right Updated:  02/01/20 2305     PTT 54 seconds     TSH, 3rd generation with Free T4 reflex [[id number]]  (Normal) Collected:  02/01/20 2218    Lab Status:  Final result Specimen:  Blood from Arm, Right Updated:  02/01/20 2305     TSH 3RD GENERATON 1 674 uIU/mL     Narrative:       Patients undergoing fluorescein dye angiography may retain small amounts of fluorescein in the body for 48-72 hours post procedure  Samples containing fluorescein can produce falsely depressed TSH values  If the patient had this procedure,a specimen should be resubmitted post fluorescein clearance        CK Total with Reflex CKMB [[id number]]  (Normal) Collected:  02/01/20 2218    Lab Status:  Final result Specimen:  Blood from Arm, Right Updated:  02/01/20 2304     Total  U/L     BMP Q8 hours X 3 (Hyponatremia monitoring) [[id number]]     Lab Status:  No result Specimen:  Blood     Fingerstick Glucose (POCT) [[id number]]  (Abnormal) Collected:  02/01/20 2248    Lab Status:  Final result Updated:  02/01/20 2249     POC Glucose 233 mg/dl     Comprehensive metabolic panel [[id number]]  (Abnormal) Collected:  02/01/20 2218    Lab Status:  Final result Specimen:  Blood from Arm, Right Updated:  02/01/20 2248     Sodium 116 mmol/L      Potassium 3 2 mmol/L      Chloride 81 mmol/L      CO2 25 mmol/L      ANION GAP 10 mmol/L      BUN 28 mg/dL      Creatinine 1 81 mg/dL      Glucose 240 mg/dL      Calcium 9 4 mg/dL      AST 28 U/L      ALT 30 U/L      Alkaline Phosphatase 163 U/L      Total Protein 7 6 g/dL      Albumin 3 4 g/dL      Total Bilirubin 1 18 mg/dL      eGFR 35 ml/min/1 73sq m     Narrative: National Kidney Disease Foundation guidelines for Chronic Kidney Disease (CKD):     Stage 1 with normal or high GFR (GFR > 90 mL/min/1 73 square meters)    Stage 2 Mild CKD (GFR = 60-89 mL/min/1 73 square meters)    Stage 3A Moderate CKD (GFR = 45-59 mL/min/1 73 square meters)    Stage 3B Moderate CKD (GFR = 30-44 mL/min/1 73 square meters)    Stage 4 Severe CKD (GFR = 15-29 mL/min/1 73 square meters)    Stage 5 End Stage CKD (GFR <15 mL/min/1 73 square meters)  Note: GFR calculation is accurate only with a steady state creatinine    Troponin I [[id number]]  (Normal) Collected:  02/01/20 2218    Lab Status:  Final result Specimen:  Blood from Arm, Right Updated:  02/01/20 2245     Troponin I 0 02 ng/mL     CBC and differential [[id number]]  (Abnormal) Collected:  02/01/20 2218    Lab Status:  Final result Specimen:  Blood from Arm, Right Updated:  02/01/20 2231     WBC 6 73 Thousand/uL      RBC 3 67 Million/uL      Hemoglobin 12 0 g/dL      Hematocrit 33 8 %      MCV 92 fL      MCH 32 7 pg      MCHC 35 5 g/dL      RDW 13 0 %      MPV 9 1 fL      Platelets 482 Thousands/uL      nRBC 0 /100 WBCs      Neutrophils Relative 64 %      Immat GRANS % 1 %      Lymphocytes Relative 20 %      Monocytes Relative 11 %      Eosinophils Relative 3 %      Basophils Relative 1 %      Neutrophils Absolute 4 44 Thousands/µL      Immature Grans Absolute 0 05 Thousand/uL      Lymphocytes Absolute 1 31 Thousands/µL      Monocytes Absolute 0 72 Thousand/µL      Eosinophils Absolute 0 17 Thousand/µL      Basophils Absolute 0 04 Thousands/µL                  CT head without contrast   ED Interpretation by Aubrie Guy MD (02/02 0107)   FINDINGS:      PARENCHYMA: Decreased attenuation is noted in periventricular and subcortical white matter demonstrating an appearance that is statistically most likely to represent mild to moderate microangiopathic change   Gray-white differentiation appears    maintained        No CT signs of acute territorial infarction   Several small old lacunar infarcts suspected in the left cerebellar hemisphere   No intracranial mass, mass effect or midline shift   No acute parenchymal hemorrhage    Parenchymal atrophy with a bifrontal    predominance      VENTRICLES AND EXTRA-AXIAL SPACES:  Ventricles and extra-axial CSF spaces are prominent commensurate with the degree of volume loss   No hydrocephalus   No acute extra-axial hemorrhage  VISUALIZED ORBITS AND PARANASAL SINUSES:  Near total opacification of the left maxillary sinus   Opacification of several anterior left ethmoid air cells   Visualized paranasal sinuses otherwise grossly clear   The orbits appear intact  CALVARIUM AND EXTRACRANIAL SOFT TISSUES:  Grossly unremarkable  Impression:        Sinus disease as described but otherwise no acute intracranial process is seen  Other findings as above  Workstation performed: [id number]         Final Result by Lito Giraldo DO (02/02 0105)      Sinus disease as described but otherwise no acute intracranial process is seen  Other findings as above  Workstation performed: [id number]         XR chest 2 views   ED Interpretation by Vinnie Oro MD (02/01 8993)   Cardiomegaly read by me                    Procedures  ECG 12 Lead Documentation Only  Date/Time: 2/1/2020 10:08 PM  Performed by: Vinnie Oro MD  Authorized by: Vinnie Oro MD     Indications / Diagnosis:  Weakness  ECG reviewed by me, the ED Provider: yes    Patient location:  ED  Previous ECG:     Previous ECG:  Compared to current    Comparison ECG info:  8/28/14    Similarity:  Changes noted (PVC now)  Quality:     Tracing quality:  Limited by artifact  Rate:     ECG rate:  79    ECG rate assessment: normal    Rhythm:     Rhythm: atrial fibrillation    Ectopy:     Ectopy: PVCs      PVCs:  Infrequent  QRS:     QRS axis:  Left  Conduction:     Conduction: abnormal      Abnormal conduction: complete RBBB    ST segments:     ST segments:  Normal  Other findings:     Other findings: LVH with strain      CriticalCare Time  Performed by: Bianca Palma MD  Authorized by: Bianca Palma MD     Critical care provider statement:     Critical care time (minutes):  35    Critical care time was exclusive of:  Separately billable procedures and treating other patients    Critical care was necessary to treat or prevent imminent or life-threatening deterioration of the following conditions:  Metabolic crisis (hyponatremia)    Critical care was time spent personally by me on the following activities:  Obtaining history from patient or surrogate, development of treatment plan with patient or surrogate, evaluation of patient's response to treatment, examination of patient, ordering and performing treatments and interventions, ordering and review of laboratory studies, ordering and review of radiographic studies and re-evaluation of patient's condition    I assumed direction of critical care for this patient from another provider in my specialty: no               ED Course  ED Course as of Feb 02 0204   Sat Feb 01, 2020   2323 K-dur po ordered  Potassium(!): 3 2   2348 Labs and EKG and CXR d/w patient and family with patient's permission  2349 IVFs ordered  Comprehensive metabolic panelKarenarmida Lopez Feb 02, 2020   0114 D/w critical care AP who will notify Dr Yasmine Rutherford for probable level 1 step down admission               HEART Risk Score      Most Recent Value   History  0 Filed at: 02/02/2020 0013   ECG  1 Filed at: 02/02/2020 0013   Age  2 Filed at: 02/02/2020 0013   Risk Factors  2 Filed at: 02/02/2020 0013   Troponin  0 Filed at: 02/02/2020 0013   Heart Score Risk Calculator   History  0 Filed at: 02/02/2020 0013   ECG  1 Filed at: 02/02/2020 0013   Age  2 Filed at: 02/02/2020 0013   Risk Factors  2 Filed at: 02/02/2020 0013   Troponin  0 Filed at: 02/02/2020 0013   HEART Score  5 Filed at: 02/02/2020 0013   HEART Score  5 Filed at: 02/02/2020 0013                            MDM  Number of Diagnoses or Management Options  Diagnosis management comments: DDx including but not limited to: metabolic abnormality, dehydration, viral illness, anemia, ACS, MI, thyroid disease, intracranial process, other infectious process including UTI; doubt [place] syndrome or myasthenia gravis or botulism or multiple sclerosis  Amount and/or Complexity of Data Reviewed  Clinical lab tests: ordered and reviewed  Tests in the radiology section of CPT®: ordered and reviewed  Decide to obtain previous medical records or to obtain history from someone other than the patient: yes  Obtain history from someone other than the patient: yes  Discuss the patient with other providers: yes  Independent visualization of images, tracings, or specimens: yes          Disposition  Final diagnoses:   Hyponatremia   Acute-on-chronic kidney injury ([facility] [address] )   Weakness     Time reflects when diagnosis was documented in both MDM as applicable and the Disposition within this note     Time User Action Codes Description Comment    2/1/2020 11:23 PM Verdie Glatter Add [E87 1] Hyponatremia     2/1/2020 11:23 PM Verdie Glatter Add [N17 9,  N18 9] Acute-on-chronic kidney injury ([facility] [address] )     2/1/2020 11:23 PM Verdie Glatter Add [R53 1] Weakness       ED Disposition     ED Disposition Condition Date/Time Comment    Admit Suresh Doyle Feb 2, 2020  2:03 AM Case was discussed with critical care AP and the patient's admission status was agreed to be Admission Status: inpatient status to the service of Dr Maral Olvera   Follow-up Information    None         Patient's Medications    No medications on file     No discharge procedures on file      ED Provider  Electronically Signed by           Angelina Sultana MD  02/02/20 0549

## 2020-02-03 LAB
ANION GAP SERPL CALCULATED.3IONS-SCNC: 10 MMOL/L (ref 4–13)
ANION GAP SERPL CALCULATED.3IONS-SCNC: 10 MMOL/L (ref 4–13)
ANION GAP SERPL CALCULATED.3IONS-SCNC: 12 MMOL/L (ref 4–13)
ANION GAP SERPL CALCULATED.3IONS-SCNC: 6 MMOL/L (ref 4–13)
ANION GAP SERPL CALCULATED.3IONS-SCNC: 7 MMOL/L (ref 4–13)
BUN SERPL-MCNC: 22 MG/DL (ref 5–25)
BUN SERPL-MCNC: 25 MG/DL (ref 5–25)
BUN SERPL-MCNC: 26 MG/DL (ref 5–25)
CALCIUM SERPL-MCNC: 8.1 MG/DL (ref 8.3–10.1)
CALCIUM SERPL-MCNC: 8.4 MG/DL (ref 8.3–10.1)
CALCIUM SERPL-MCNC: 8.9 MG/DL (ref 8.3–10.1)
CALCIUM SERPL-MCNC: 9.1 MG/DL (ref 8.3–10.1)
CALCIUM SERPL-MCNC: 9.4 MG/DL (ref 8.3–10.1)
CHLORIDE SERPL-SCNC: 87 MMOL/L (ref 100–108)
CHLORIDE SERPL-SCNC: 90 MMOL/L (ref 100–108)
CHLORIDE SERPL-SCNC: 91 MMOL/L (ref 100–108)
CO2 SERPL-SCNC: 26 MMOL/L (ref 21–32)
CO2 SERPL-SCNC: 26 MMOL/L (ref 21–32)
CO2 SERPL-SCNC: 27 MMOL/L (ref 21–32)
CO2 SERPL-SCNC: 27 MMOL/L (ref 21–32)
CO2 SERPL-SCNC: 28 MMOL/L (ref 21–32)
CORTIS SERPL-MCNC: 14.6 UG/DL
CREAT SERPL-MCNC: 1.5 MG/DL (ref 0.6–1.3)
CREAT SERPL-MCNC: 1.53 MG/DL (ref 0.6–1.3)
CREAT SERPL-MCNC: 1.57 MG/DL (ref 0.6–1.3)
CREAT SERPL-MCNC: 1.67 MG/DL (ref 0.6–1.3)
CREAT SERPL-MCNC: 1.84 MG/DL (ref 0.6–1.3)
GFR SERPL CREATININE-BSD FRML MDRD: 34 ML/MIN/1.73SQ M
GFR SERPL CREATININE-BSD FRML MDRD: 38 ML/MIN/1.73SQ M
GFR SERPL CREATININE-BSD FRML MDRD: 41 ML/MIN/1.73SQ M
GFR SERPL CREATININE-BSD FRML MDRD: 42 ML/MIN/1.73SQ M
GFR SERPL CREATININE-BSD FRML MDRD: 43 ML/MIN/1.73SQ M
GLUCOSE SERPL-MCNC: 128 MG/DL (ref 65–140)
GLUCOSE SERPL-MCNC: 138 MG/DL (ref 65–140)
GLUCOSE SERPL-MCNC: 140 MG/DL (ref 65–140)
GLUCOSE SERPL-MCNC: 149 MG/DL (ref 65–140)
GLUCOSE SERPL-MCNC: 177 MG/DL (ref 65–140)
GLUCOSE SERPL-MCNC: 177 MG/DL (ref 65–140)
GLUCOSE SERPL-MCNC: 181 MG/DL (ref 65–140)
GLUCOSE SERPL-MCNC: 190 MG/DL (ref 65–140)
GLUCOSE SERPL-MCNC: 221 MG/DL (ref 65–140)
POTASSIUM SERPL-SCNC: 3.6 MMOL/L (ref 3.5–5.3)
POTASSIUM SERPL-SCNC: 3.8 MMOL/L (ref 3.5–5.3)
POTASSIUM SERPL-SCNC: 3.8 MMOL/L (ref 3.5–5.3)
POTASSIUM SERPL-SCNC: 3.9 MMOL/L (ref 3.5–5.3)
POTASSIUM SERPL-SCNC: 3.9 MMOL/L (ref 3.5–5.3)
SODIUM SERPL-SCNC: 121 MMOL/L (ref 136–145)
SODIUM SERPL-SCNC: 123 MMOL/L (ref 136–145)
SODIUM SERPL-SCNC: 127 MMOL/L (ref 136–145)
SODIUM SERPL-SCNC: 128 MMOL/L (ref 136–145)
SODIUM SERPL-SCNC: 128 MMOL/L (ref 136–145)
URATE SERPL-MCNC: 5.8 MG/DL (ref 4.2–8)

## 2020-02-03 PROCEDURE — 82533 TOTAL CORTISOL: CPT | Performed by: INTERNAL MEDICINE

## 2020-02-03 PROCEDURE — 99233 SBSQ HOSP IP/OBS HIGH 50: CPT | Performed by: INTERNAL MEDICINE

## 2020-02-03 PROCEDURE — 80048 BASIC METABOLIC PNL TOTAL CA: CPT | Performed by: PHYSICIAN ASSISTANT

## 2020-02-03 PROCEDURE — NC001 PR NO CHARGE: Performed by: INTERNAL MEDICINE

## 2020-02-03 PROCEDURE — 80048 BASIC METABOLIC PNL TOTAL CA: CPT | Performed by: INTERNAL MEDICINE

## 2020-02-03 PROCEDURE — 84550 ASSAY OF BLOOD/URIC ACID: CPT | Performed by: INTERNAL MEDICINE

## 2020-02-03 PROCEDURE — 82948 REAGENT STRIP/BLOOD GLUCOSE: CPT

## 2020-02-03 RX ORDER — WARFARIN SODIUM 2.5 MG/1
2.5 TABLET ORAL
Status: DISCONTINUED | OUTPATIENT
Start: 2020-02-03 | End: 2020-02-05 | Stop reason: HOSPADM

## 2020-02-03 RX ADMIN — INSULIN LISPRO 2 UNITS: 100 INJECTION, SOLUTION INTRAVENOUS; SUBCUTANEOUS at 11:52

## 2020-02-03 RX ADMIN — WARFARIN SODIUM 2.5 MG: 2.5 TABLET ORAL at 17:34

## 2020-02-03 RX ADMIN — CHLORHEXIDINE GLUCONATE 0.12% ORAL RINSE 15 ML: 1.2 LIQUID ORAL at 08:04

## 2020-02-03 RX ADMIN — METOPROLOL TARTRATE 100 MG: 100 TABLET, FILM COATED ORAL at 21:41

## 2020-02-03 RX ADMIN — AMLODIPINE BESYLATE 5 MG: 5 TABLET ORAL at 08:04

## 2020-02-03 RX ADMIN — INSULIN LISPRO 1 UNITS: 100 INJECTION, SOLUTION INTRAVENOUS; SUBCUTANEOUS at 21:41

## 2020-02-03 RX ADMIN — HEPARIN SODIUM 5000 UNITS: 5000 INJECTION INTRAVENOUS; SUBCUTANEOUS at 21:40

## 2020-02-03 RX ADMIN — HEPARIN SODIUM 5000 UNITS: 5000 INJECTION INTRAVENOUS; SUBCUTANEOUS at 05:26

## 2020-02-03 RX ADMIN — MELATONIN 3 MG: at 21:41

## 2020-02-03 RX ADMIN — METOPROLOL TARTRATE 100 MG: 100 TABLET, FILM COATED ORAL at 08:04

## 2020-02-03 NOTE — ASSESSMENT & PLAN NOTE
- coumadin held  - was put on heparin subq  - consider changing back to coumadin  - monitor INR, currently therapeutic

## 2020-02-03 NOTE — PROGRESS NOTES
Critical Care Interval Progress Note:  Patient being followed by nephrology for hyponatremia  He was started on 1 8% saline infusion around 1830  Chemistry just sent shows Na of 119 from 119  Spoke with nephrology  Will give 40mg of lasix now and continue saline infusion       Adria Mello PA-C

## 2020-02-03 NOTE — ASSESSMENT & PLAN NOTE
Lab Results   Component Value Date    HGBA1C 8 4 (H) 08/29/2014       Recent Labs     02/02/20  1631 02/02/20  2057 02/03/20  0802 02/03/20  1129   POCGLU 151* 258* 128 221*       Blood Sugar Average: Last 72 hrs:  (P) 195 5     - Insulin sliding scale  - monitor fingerstick glucose

## 2020-02-03 NOTE — ASSESSMENT & PLAN NOTE
Patient complained of weakness, mostly in the legs    - Slightly improved with sodium replacement  - Allow patient to ambulate with assitance  - Consider PT/OT

## 2020-02-03 NOTE — TREATMENT PLAN
Continue current dose of 1 8% saline  Sodium level appropriately and slowly improving    Recheck sodium level at 1600 hours

## 2020-02-03 NOTE — ASSESSMENT & PLAN NOTE
- Patient follows with [facility] cardiology  - known to have mitral valve regurgitation with declining function  - due for echo this week with LVH

## 2020-02-03 NOTE — PROGRESS NOTES
Transfer Progress Note - Alvina Acuña 1940, [de-identified] y o  male MRN: [id number]    Unit/Bed#: S -01 Encounter: [id number]    Primary Care Provider: Karyle Carp, DO   Date and time admitted to hospital: 2/1/2020  9:49 PM        * Hyponatremia  Assessment & Plan  Presented with weakness and sodium of 116    -Replete with 1 8% NS as per nephrology  -BMP q4    General weakness  Assessment & Plan  Patient complained of weakness, mostly in the legs    - Slightly improved with sodium replacement  - Allow patient to ambulate with assitance  - Consider PT/OT    Atrial fibrillation ([address] )  Assessment & Plan  - coumadin held  - was put on heparin subq  - consider changing back to coumadin  - monitor INR, currently therapeutic    CAD (coronary artery disease)  Assessment & Plan  - Continue home meds    DM (diabetes mellitus), type 2 [facility])  Assessment & Plan  Lab Results   Component Value Date    HGBA1C 8 4 (H) 08/29/2014       Recent Labs     02/02/20  1631 02/02/20  2057 02/03/20  0802 02/03/20  1129   POCGLU 151* 258* 128 221*       Blood Sugar Average: Last 72 hrs:  (P) 195 5     - Insulin sliding scale  - monitor fingerstick glucose    Mitral regurgitation  Assessment & Plan  - Patient follows with [facility] cardiology  - known to have mitral valve regurgitation with declining function  - due for echo this week with LVH    Hypertension  Assessment & Plan  Continue home meds    -hold chlorthalidone    CKD (chronic kidney disease)  Assessment & Plan  Creatinine 1 57    - Improved  - monitor I/Os  - continue to monitor with bmp    Code Status: Level 1 - Full Code  POA:    POLST:      Reason for ICU admission:   Weakness    Active problems:   Principal Problem:    Hyponatremia  Active Problems:    General weakness    Atrial fibrillation (HCC)    CKD (chronic kidney disease)    Hypertension    Mitral regurgitation    Hyperlipemia    DM (diabetes mellitus), type 2 (HCC)    CAD (coronary artery disease)  Resolved Problems:    * No resolved hospital problems  *      Consultants:   Nephrology    History of Present Illness:   [de-identified]year old male, ICU day 2, Full Code, with a PMH DM2, CKD, HTN, CAD, Afib on coumadin, mitral regurg that presented with generalized weakness, more pronounced in his legs  He recently began drinking more water than usual  Before coming to the hospital, he drank 3-16 ounce bottles of water which is more then previous  He denies any n/v/d, pain, headaches, or seizures  Sodium was noted to be 116      Initially was put on 3% saline, sodium levels quickly went up to 123 and 3% was stopped as per nephrology  Patients sodium levels began to drop again and 1 8% was started  Given lasix 40    Summary of clinical course:   [de-identified]year old male, ICU day 2, Full Code, with a PMH DM2, CKD, HTN, CAD, Afib on coumadin, mitral regurg that presented with generalized weakness, more pronounced in his legs  He recently began drinking more water than usual  Before coming to the hospital, he drank 3-16 ounce bottles of water which is more then previous  He denies any n/v/d, pain, headaches, or seizures  Sodium was noted to be 116  Initially was put on 3% saline, sodium levels quickly went up to 123 and 3% was stopped as per nephrology  Patients sodium levels began to drop again and 1 8% was started  Given lasix 40 for edema and volume overload  Has been diuresing well   Nephrology is following and agrees to continue 1 8% NS and follow sodium levels q4    Recent or scheduled procedures:   N/A    Outstanding/pending diagnostics:   N/A    Cultures:   N/A       Mobilization Plan:   Up with assistance    Nutrition Plan:   Sathya/CHO controlled    VTE Pharmacologic Prophylaxis: Heparin  VTE Mechanical Prophylaxis: sequential compression device    Discharge Plan:   Patient should be ready for discharge as per SLIM    Initial Physical Therapy Recommendations: N/A  Initial Occupational Therapy Recommendations: N/A  Initial  Assessment/Plan: N/A    Home medications that are not reordered and reason why:   Chlorthalidone- may lower sodium levels  Warfarin- switched to heparin subq      Spoke with Dr Dileep Ordonez  regarding transfer  Please call [phone] with any questions or concerns  Portions of the record may have been created with voice recognition software  Occasional wrong word or "sound a like" substitutions may have occurred due to the inherent limitations of voice recognition software  Read the chart carefully and recognize, using context, where substitutions have occurred

## 2020-02-03 NOTE — PROGRESS NOTES
Nae Mathur PROGRESS NOTE   Karon Romberg [de-identified] y o  male MRN: [id number]  Unit/Bed#: ICU 07 Encounter: [id number]  Reason for Consult:  Hyponatremia    ASSESSMENT and PLAN:  1  Hyponatremia:  · Sodium level 116 on 02/01/2020 requiring ICU admission  · Treated with 3% saline with appropriate improvement in saline  After which sodium level declined slightly  Given 1 dose of Lasix 40 mg IV on 02/02/2020  1 8% saline started  Over the last 4 hours sodium level has improved from 123 to 127  · Hypertonic saline discontinued on 02/02/2020  · Workup:    · TSH normal, serum osmolality 259, urine osmolality 395, urine sodium 83  Uric acid 5 8  Cortisol level 14  6  · Chest x-ray shows cardiomegaly  · CT of the head negative for pathology  · Etiology:  Not entirely clear  Appears to be underlying chronicity with sodium levels in the low 130s  Suspected to be related to increased oral intake in the setting of thiazide diuretic use/CKD  No evidence of thyroid or adrenal abnormality  · Plan:    · Decrease 1 8% saline to 20 mL/hour and repeat sodium level at 12 noon- order placed earlier this morning  · Goal rate of correction 129-131 over 24 hours  2  Chronic kidney disease, stage IV:    · Creatinine 1 5  Baseline creatinine 2-2 3 mg/dL  · Followed by Dr Kat Roe of [address] [place] [address] Nephrology  · Renal function has been stable  3  Hypertension: On amlodipine and metoprolol  Blood pressure controlled  4  Edema: Will eventually need a loop diuretic after volume status has been stabilized and sodium level has improved  5  Hypokalemia:  Resolved  Potassium 3 9    DISPOSITION:  Continue 1 8% saline, decrease rate to 20 mL an hour  Recheck sodium level at 12 noon    SUBJECTIVE / INTERVAL HISTORY:  Patient asking when he can go home  He is worried about his wife who is in a nursing facility and has Alzheimer's dementia  He visits with her every day      OBJECTIVE:  Current Weight: Weight - Scale: 93 kg (205 lb 0 4 oz)  Vitals:    02/02/20 2330 02/03/20 0245 02/03/20 0300 02/03/20 0800   BP: 148/77 109/92 109/95 133/87   BP Location:   Right arm Right arm   Pulse: 65 66 65 77   Resp:   15 14   Temp:   98 1 °F (36 7 °C) 98 4 °F (36 9 °C)   TempSrc:   Oral Oral   SpO2: 95% 94% 96% 91%   Weight:           Intake/Output Summary (Last 24 hours) at 2/3/2020 0919  Last data filed at 2/3/2020 0450  Gross per 24 hour   Intake 1700 ml   Output 3180 ml   Net -1480 ml     General: NAD, comfortably sitting in the chair  Skin: no rash  Eyes: anicteric sclera  ENT: moist mucous membrane  Neck: supple, no JVD  Chest: CTA b/l, no ronchii, no wheeze, no rubs, no rales  CVS: s1s2, no murmur, no gallop, no rub  Abdomen: soft, nontender, nl sounds  Extremities: no edema LE b/l  : no castillo  Neuro: AAOX3  Psych: normal affect  Medications:    Current Facility-Administered Medications:     amLODIPine (NORVASC) tablet 5 mg, 5 mg, Oral, Daily, Zak Monsalve PA-C, 5 mg at 02/03/20 0804    chlorhexidine (PERIDEX) 0 12 % oral rinse 15 mL, 15 mL, Swish & Spit, Q12H [address], Jarocho CARMEN Coy, 15 mL at 02/03/20 0804    heparin (porcine) subcutaneous injection 5,000 Units, 5,000 Units, Subcutaneous, Q8H [address], 5,000 Units at 02/03/20 0526 **AND** [COMPLETED] Platelet count, , , Once, Zak Monsalve PA-C    insulin lispro (HumaLOG) 100 units/mL subcutaneous injection 1-6 Units, 1-6 Units, Subcutaneous, TID AC, 1 Units at 02/02/20 1726 **AND** Fingerstick Glucose (POCT), , , TID AC, Zak Monsalve PA-C    insulin lispro (HumaLOG) 100 units/mL subcutaneous injection 1-6 Units, 1-6 Units, Subcutaneous, HS, Zak Monsalve PA-C, 3 Units at 02/02/20 2101    melatonin tablet 3 mg, 3 mg, Oral, HS, Zak Monsalve PA-C, 3 mg at 02/02/20 2101    metoprolol tartrate (LOPRESSOR) tablet 100 mg, 100 mg, Oral, Q12H [address], Jarocho CARMEN Coy, 100 mg at 02/03/20 0804    sodium chloride (concentrated) 308 mEq in sterile water 1,000 mL infusion, , Intravenous, Continuous, Aguila Ivy MD, Last Rate: 60 mL/hr at 02/02/20 1829    Laboratory Results:  Results from last 7 days   Lab Units 02/03/20  0803 02/03/20  0408 02/03/20  0011 02/02/20 2017 02/02/20  1553 02/02/20  1206 02/02/20  0745 02/02/20  0254 02/01/20  2218   WBC Thousand/uL  --   --   --   --   --   --   --   --  6 73   HEMOGLOBIN g/dL  --   --   --   --   --   --   --   --  12 0   HEMATOCRIT %  --   --   --   --   --   --   --   --  33 8*   PLATELETS Thousands/uL  --   --   --   --   --   --   --  236 244   POTASSIUM mmol/L 3 9 3 8 3 9 3 5 3 9 3 8 3 7 3 5 3 2*   CHLORIDE mmol/L 90* 90* 87* 85* 85* 84* 85* 82* 81*   CO2 mmol/L 27 26 28 26 27 27 28 26 25   BUN mg/dL 22 22 25 25 26* 26* 26* 26* 28*   CREATININE mg/dL 1 50* 1 57* 1 67* 1 62* 1 65* 1 65* 1 69* 1 77* 1 81*   CALCIUM mg/dL 8 4 8 9 9 1 9 1 9 2 8 8 8 7 9 6 9 4

## 2020-02-03 NOTE — PROGRESS NOTES
Daily Progress Note - Critical Care   Vj Escobar [de-identified] y o  male MRN: [id number]  Unit/Bed#: ICU 07 Encounter: [id number]        ----------------------------------------------------------------------------------------  HPI/24hr events:  [de-identified]year old male, ICU day 2, Full Code, with a PMH DM2, CKD, HTN, CAD, Afib on coumadin, mitral regurg that presented with generalized weakness, more pronounced in his legs  He recently began drinking more water than usual  Before coming to the hospital, he drank 3-16 ounce bottles of water which is more then previous  He denies any n/v/d, pain, headaches, or seizures  Sodium was noted to be 116  Initially was put on 3% saline, sodium levels quickly went up to 123 and 3% was stopped as per nephrology  Patients sodium levels began to drop again and 1 8% was started  Given lasix 40    ---------------------------------------------------------------------------------------  SUBJECTIVE  Weakness    Review of Systems   Constitutional: Negative for activity change, appetite change, fatigue and fever  HENT: Negative for congestion, rhinorrhea, sneezing and sore throat  Eyes: Negative for pain, discharge, redness and itching  Respiratory: Negative for cough, chest tightness, shortness of breath and wheezing  Cardiovascular: Negative for chest pain and palpitations  Gastrointestinal: Negative for abdominal distention, abdominal pain, constipation, diarrhea, nausea and vomiting  Genitourinary: Negative for decreased urine volume, difficulty urinating and dysuria  Musculoskeletal: Negative for arthralgias, back pain and myalgias  Skin: Negative for rash  Neurological: Positive for weakness  Negative for dizziness, numbness and headaches  Hematological: Negative for adenopathy  Psychiatric/Behavioral: Negative for confusion       Review of systems was reviewed and negative unless stated above in HPI/24-hour events ---------------------------------------------------------------------------------------  Assessment and Plan:    Neuro:    Diagnosis: seizure risk due to hyponatremia  o Plan: continue monitoring neuro exam    CV:    Diagnosis: Chronic a  Fib on coumadin  o Plan: continue to hold coumadin  o Continue heparin drip   Diagnosis: Hypertension  o Plan: continue home amlodipine  o Continue to hold chlorthalidone as this could decrease sodium levels further  Diagnosis: mitral regurgitation  Plan: to have NINI this month     Pulm:  o No acute issues      GI:   - No acute issues    :    Diagnosis: CKD  o Plan:   o Currently 1 57  o Continue to monitor  o Monitor I/Os      F/E/N:    Diagnosis: Hyponatremia  Plan: continue 1 8% saline  - nephrology on board  Bmp q4  Sosm 259, Uosm 395, Phyllis 83  Awaiting cortisol and uric acid  Possible SIADH    Heme/Onc:   o No active issues  o Continue fingerstick glucose      Endo:    Diagnosis: DM2  o Plan: continue sliding scale insulin      ID:   o No active issues      MSK/Skin:   o Diagnosis: Generalized weakness  o Currently correcting sodium levels  o Ambulate patient today      Disposition: Continue Critical Care   Code Status: Level 1 - Full Code  ---------------------------------------------------------------------------------------  ICU CORE MEASURES    Prophylaxis   VTE Pharmacologic Prophylaxis: Heparin  VTE Mechanical Prophylaxis: sequential compression device  Stress Ulcer Prophylaxis: Prophylaxis Not Indicated     ABCDE Protocol (if indicated)  Plan to perform spontaneous awakening trial today? Not applicable  Plan to perform spontaneous breathing trial today? Not applicable  Obvious barriers to extubation?  Not applicable  CAM-ICU: N/A    Invasive Devices Review  Invasive Devices     Peripheral Intravenous Line            Peripheral IV 02/01/20 Right Antecubital 1 day    Peripheral IV 02/02/20 Left Antecubital 1 day              Can any invasive devices be discontinued today? Not applicable  ---------------------------------------------------------------------------------------  OBJECTIVE    Vitals   Vitals:    20 2300 20 2330 20 0245 20 0300   BP: 148/77 148/77 109/92 109/95   BP Location: Right arm   Right arm   Pulse: 63 65 66 65   Resp: 12   15   Temp: (!) 97 4 °F (36 3 °C)   98 1 °F (36 7 °C)   TempSrc: Oral   Oral   SpO2: 96% 95% 94% 96%   Weight:         Temp (24hrs), Av 8 °F (36 6 °C), Min:97 4 °F (36 3 °C), Max:98 1 °F (36 7 °C)  Current: Temperature: 98 1 °F (36 7 °C)  HR: 65  BP: 109/95  RR: 15  SpO2: 96    Physical Exam   Constitutional: He is oriented to person, place, and time  He appears well-developed and well-nourished  No distress  HENT:   Head: Normocephalic and atraumatic  Nose: Nose normal    Mouth/Throat: Oropharynx is clear and moist  No oropharyngeal exudate  Eyes: Conjunctivae are normal  Right eye exhibits no discharge  Left eye exhibits no discharge  No scleral icterus  Cardiovascular: Normal rate, regular rhythm and normal heart sounds  No murmur heard  Pulmonary/Chest: Effort normal and breath sounds normal  No respiratory distress  He has no wheezes  Abdominal: Soft  He exhibits no distension  There is no tenderness  Musculoskeletal: He exhibits edema (bilateral lower extremities)  He exhibits no tenderness  Lymphadenopathy:     He has no cervical adenopathy  Neurological: He is alert and oriented to person, place, and time  Skin: Skin is warm and dry  No rash noted  He is not diaphoretic  Psychiatric: His behavior is normal    Nursing note and vitals reviewed          Respiratory:  SpO2: SpO2: 96 %       Invasive/non-invasive ventilation settings   Respiratory    Lab Data (Last 4 hours)    None         O2/Vent Data (Last 4 hours)    None                Laboratory and Diagnostics:  Results from last 7 days   Lab Units 20  0254 20  2218   WBC Thousand/uL  --  6 73   HEMOGLOBIN g/dL  --  12 0   HEMATOCRIT %  --  33 8*   PLATELETS Thousands/uL 236 244   NEUTROS PCT %  --  64   MONOS PCT %  --  11     Results from last 7 days   Lab Units 20  0408 20  0011 20  2017 20  1553 20  1206 20  0745 20  0254 20  2218   SODIUM mmol/L 123* 121* 119* 119* 121* 123* 116* 116*   POTASSIUM mmol/L 3 8 3 9 3 5 3 9 3 8 3 7 3 5 3 2*   CHLORIDE mmol/L 90* 87* 85* 85* 84* 85* 82* 81*   CO2 mmol/L 26 28 26 27 27 28 26 25   ANION GAP mmol/L 7 6 8 7 10 10 8 10   BUN mg/dL 22 25 25 26* 26* 26* 26* 28*   CREATININE mg/dL 1 57* 1 67* 1 62* 1 65* 1 65* 1 69* 1 77* 1 81*   CALCIUM mg/dL 8 9 9 1 9 1 9 2 8 8 8 7 9 6 9 4   GLUCOSE RANDOM mg/dL 138 177* 246* 163* 203* 156* 176* 240*   ALT U/L  --   --   --   --   --   --   --  30   AST U/L  --   --   --   --   --   --   --  28   ALK PHOS U/L  --   --   --   --   --   --   --  163*   ALBUMIN g/dL  --   --   --   --   --   --   --  3 4*   TOTAL BILIRUBIN mg/dL  --   --   --   --   --   --   --  1 18*          Results from last 7 days   Lab Units 20  2218   INR  2 08*   PTT seconds 54*      Results from last 7 days   Lab Units 20  2218   TROPONIN I ng/mL 0 02         ABG:    VBG:  Results from last 7 days   Lab Units 20  2319   PH MIKE  7 380   PCO2 MIKE mm Hg 43 9   PO2 MIKE mm Hg 38 5   HCO3 MIKE mmol/L 25 4   BASE EXC MIKE mmol/L 0 1           Micro        [id number]  Imaging:  I have personally reviewed pertinent reports  Intake and Output  I/O       701 -  0700 02/ 07 0700    P  O  360 720     I V  (mL/kg) 417 5 (4 5) 812 5 (8 7)     IV Piggyback 250      Total Intake(mL/kg) 1027 5 (11) 1532 5 (16 5)     Urine (mL/kg/hr) 780 3430 (1 5)     Stool  0     Total Output 780 3430     Net +247 5 -1897 5            Unmeasured Stool Occurrence  1 x         UOP: 1 5    Height a ml/hr nd Weights      IBW: -88 kg  There is no height or weight on file to calculate BMI   Weight (last 2 days)     Date/Time   Weight    02/02/20 0555   93 (205 03)    02/02/20 0202   93 (205 03)    02/01/20 2154   95 2 (209 88)                Nutrition       Diet Orders   (From admission, onward)             Start     Ordered    02/02/20 0613  Room Service  Once     Question:  Type of Service  Answer:  Room Service - Appropriate with Assistance    02/02/20 0612    02/02/20 0257  Diet Sathya/CHO Controlled; Consistent Carbohydrate Diet Level 2 (5 carb servings/75 grams CHO/meal); Fluid Restriction 1000 ML  Diet effective now     Question Answer Comment   Diet Type Sathya/CHO Controlled    Sathya/CHO Controlled Consistent Carbohydrate Diet Level 2 (5 carb servings/75 grams CHO/meal)    Other Restriction(s): Fluid Restriction 1000 ML    RD to adjust diet per protocol?  No        02/02/20 0257                  Active Medications  Scheduled Meds:  Current Facility-Administered Medications:  amLODIPine 5 mg Oral Daily Ele James PA-C    chlorhexidine 15 mL Swish & Spit Q12H [facility] Ele James PA-C    heparin (porcine) 5,000 Units Subcutaneous [facility] Ele James PA-C    insulin lispro 1-6 Units Subcutaneous TID AC Jarocho Coy PA-C    insulin lispro 1-6 Units Subcutaneous HS Ele James PA-C    melatonin 3 mg Oral HS Ele James PA-C    metoprolol tartrate 100 mg Oral Q12H [facility] DAWOOD RichardsonC    IV infusion builder  Intravenous Continuous Sg Sabillon MD Last Rate: 60 mL/hr at 02/02/20 1829     Continuous Infusions:    IV infusion builder  Last Rate: 60 mL/hr at 02/02/20 1829     PRN Meds:        Allergies   No Known Allergies  ---------------------------------------------------------------------------------------  Advance Directive and Living Will:      Power of :    POLST:    ---------------------------------------------------------------------------------------  Counseling / Coordination of Care  Total Critical Care time spent 40 minutes excluding procedures, teaching and family updates  Eliana Sanchez MD      Portions of the record may have been created with voice recognition software  Occasional wrong word or "sound a like" substitutions may have occurred due to the inherent limitations of voice recognition software    Read the chart carefully and recognize, using context, where substitutions have occurred

## 2020-02-04 PROBLEM — N18.4 CHRONIC KIDNEY DISEASE (CKD), STAGE IV (SEVERE) (HCC): Status: ACTIVE | Noted: 2020-02-02

## 2020-02-04 LAB
ANION GAP SERPL CALCULATED.3IONS-SCNC: 11 MMOL/L (ref 4–13)
ANION GAP SERPL CALCULATED.3IONS-SCNC: 13 MMOL/L (ref 4–13)
BUN SERPL-MCNC: 22 MG/DL (ref 5–25)
BUN SERPL-MCNC: 22 MG/DL (ref 5–25)
BUN SERPL-MCNC: 24 MG/DL (ref 5–25)
BUN SERPL-MCNC: 25 MG/DL (ref 5–25)
CA-I BLD-SCNC: 1.05 MMOL/L (ref 1.12–1.32)
CALCIUM SERPL-MCNC: 8.8 MG/DL (ref 8.3–10.1)
CALCIUM SERPL-MCNC: 9 MG/DL (ref 8.3–10.1)
CALCIUM SERPL-MCNC: 9.3 MG/DL (ref 8.3–10.1)
CALCIUM SERPL-MCNC: 9.4 MG/DL (ref 8.3–10.1)
CHLORIDE SERPL-SCNC: 91 MMOL/L (ref 100–108)
CHLORIDE SERPL-SCNC: 91 MMOL/L (ref 100–108)
CHLORIDE SERPL-SCNC: 92 MMOL/L (ref 100–108)
CHLORIDE SERPL-SCNC: 92 MMOL/L (ref 100–108)
CO2 SERPL-SCNC: 25 MMOL/L (ref 21–32)
CO2 SERPL-SCNC: 26 MMOL/L (ref 21–32)
CO2 SERPL-SCNC: 27 MMOL/L (ref 21–32)
CO2 SERPL-SCNC: 27 MMOL/L (ref 21–32)
CREAT SERPL-MCNC: 1.66 MG/DL (ref 0.6–1.3)
CREAT SERPL-MCNC: 1.7 MG/DL (ref 0.6–1.3)
CREAT SERPL-MCNC: 1.71 MG/DL (ref 0.6–1.3)
CREAT SERPL-MCNC: 1.84 MG/DL (ref 0.6–1.3)
ERYTHROCYTE [DISTWIDTH] IN BLOOD BY AUTOMATED COUNT: 13.5 % (ref 11.6–15.1)
GFR SERPL CREATININE-BSD FRML MDRD: 34 ML/MIN/1.73SQ M
GFR SERPL CREATININE-BSD FRML MDRD: 37 ML/MIN/1.73SQ M
GFR SERPL CREATININE-BSD FRML MDRD: 37 ML/MIN/1.73SQ M
GFR SERPL CREATININE-BSD FRML MDRD: 38 ML/MIN/1.73SQ M
GLUCOSE SERPL-MCNC: 139 MG/DL (ref 65–140)
GLUCOSE SERPL-MCNC: 146 MG/DL (ref 65–140)
GLUCOSE SERPL-MCNC: 155 MG/DL (ref 65–140)
GLUCOSE SERPL-MCNC: 160 MG/DL (ref 65–140)
GLUCOSE SERPL-MCNC: 206 MG/DL (ref 65–140)
GLUCOSE SERPL-MCNC: 209 MG/DL (ref 65–140)
GLUCOSE SERPL-MCNC: 212 MG/DL (ref 65–140)
GLUCOSE SERPL-MCNC: 217 MG/DL (ref 65–140)
GLUCOSE SERPL-MCNC: 220 MG/DL (ref 65–140)
HCT VFR BLD AUTO: 37.2 % (ref 36.5–49.3)
HGB BLD-MCNC: 12.7 G/DL (ref 12–17)
INR PPP: 1.87 (ref 0.84–1.19)
MAGNESIUM SERPL-MCNC: 1 MG/DL (ref 1.6–2.6)
MCH RBC QN AUTO: 32.2 PG (ref 26.8–34.3)
MCHC RBC AUTO-ENTMCNC: 34.1 G/DL (ref 31.4–37.4)
MCV RBC AUTO: 94 FL (ref 82–98)
PHOSPHATE SERPL-MCNC: 2.6 MG/DL (ref 2.3–4.1)
PLATELET # BLD AUTO: 275 THOUSANDS/UL (ref 149–390)
PMV BLD AUTO: 8.5 FL (ref 8.9–12.7)
POTASSIUM SERPL-SCNC: 3.5 MMOL/L (ref 3.5–5.3)
POTASSIUM SERPL-SCNC: 3.6 MMOL/L (ref 3.5–5.3)
POTASSIUM SERPL-SCNC: 3.7 MMOL/L (ref 3.5–5.3)
POTASSIUM SERPL-SCNC: 3.9 MMOL/L (ref 3.5–5.3)
PROTHROMBIN TIME: 20.7 SECONDS (ref 11.6–14.5)
RBC # BLD AUTO: 3.95 MILLION/UL (ref 3.88–5.62)
SODIUM SERPL-SCNC: 129 MMOL/L (ref 136–145)
SODIUM SERPL-SCNC: 130 MMOL/L (ref 136–145)
WBC # BLD AUTO: 6.83 THOUSAND/UL (ref 4.31–10.16)

## 2020-02-04 PROCEDURE — 99232 SBSQ HOSP IP/OBS MODERATE 35: CPT | Performed by: INTERNAL MEDICINE

## 2020-02-04 PROCEDURE — 99233 SBSQ HOSP IP/OBS HIGH 50: CPT | Performed by: INTERNAL MEDICINE

## 2020-02-04 PROCEDURE — 82948 REAGENT STRIP/BLOOD GLUCOSE: CPT

## 2020-02-04 PROCEDURE — 85610 PROTHROMBIN TIME: CPT | Performed by: FAMILY MEDICINE

## 2020-02-04 PROCEDURE — 80048 BASIC METABOLIC PNL TOTAL CA: CPT | Performed by: INTERNAL MEDICINE

## 2020-02-04 PROCEDURE — 83735 ASSAY OF MAGNESIUM: CPT | Performed by: FAMILY MEDICINE

## 2020-02-04 PROCEDURE — 80048 BASIC METABOLIC PNL TOTAL CA: CPT | Performed by: FAMILY MEDICINE

## 2020-02-04 PROCEDURE — 82330 ASSAY OF CALCIUM: CPT | Performed by: FAMILY MEDICINE

## 2020-02-04 PROCEDURE — 85027 COMPLETE CBC AUTOMATED: CPT | Performed by: FAMILY MEDICINE

## 2020-02-04 PROCEDURE — 84100 ASSAY OF PHOSPHORUS: CPT | Performed by: FAMILY MEDICINE

## 2020-02-04 RX ORDER — MAGNESIUM SULFATE HEPTAHYDRATE 40 MG/ML
4 INJECTION, SOLUTION INTRAVENOUS ONCE
Status: COMPLETED | OUTPATIENT
Start: 2020-02-04 | End: 2020-02-04

## 2020-02-04 RX ORDER — POTASSIUM CHLORIDE 20 MEQ/1
20 TABLET, EXTENDED RELEASE ORAL DAILY
Status: DISCONTINUED | OUTPATIENT
Start: 2020-02-04 | End: 2020-02-05 | Stop reason: HOSPADM

## 2020-02-04 RX ORDER — TORSEMIDE 20 MG/1
10 TABLET ORAL DAILY
Status: DISCONTINUED | OUTPATIENT
Start: 2020-02-04 | End: 2020-02-05 | Stop reason: HOSPADM

## 2020-02-04 RX ORDER — SODIUM CHLORIDE 1000 MG
1 TABLET, SOLUBLE MISCELLANEOUS
Status: DISCONTINUED | OUTPATIENT
Start: 2020-02-04 | End: 2020-02-05 | Stop reason: HOSPADM

## 2020-02-04 RX ADMIN — HEPARIN SODIUM 5000 UNITS: 5000 INJECTION INTRAVENOUS; SUBCUTANEOUS at 05:43

## 2020-02-04 RX ADMIN — TORSEMIDE 10 MG: 20 TABLET ORAL at 12:38

## 2020-02-04 RX ADMIN — INSULIN LISPRO 2 UNITS: 100 INJECTION, SOLUTION INTRAVENOUS; SUBCUTANEOUS at 21:46

## 2020-02-04 RX ADMIN — Medication 1 G: at 17:15

## 2020-02-04 RX ADMIN — POTASSIUM CHLORIDE 20 MEQ: 1500 TABLET, EXTENDED RELEASE ORAL at 12:38

## 2020-02-04 RX ADMIN — MAGNESIUM SULFATE HEPTAHYDRATE 4 G: 40 INJECTION, SOLUTION INTRAVENOUS at 10:59

## 2020-02-04 RX ADMIN — MELATONIN 3 MG: at 21:45

## 2020-02-04 RX ADMIN — WARFARIN SODIUM 2.5 MG: 2.5 TABLET ORAL at 17:15

## 2020-02-04 RX ADMIN — HEPARIN SODIUM 5000 UNITS: 5000 INJECTION INTRAVENOUS; SUBCUTANEOUS at 21:45

## 2020-02-04 RX ADMIN — METOPROLOL TARTRATE 100 MG: 100 TABLET, FILM COATED ORAL at 21:45

## 2020-02-04 RX ADMIN — METOPROLOL TARTRATE 100 MG: 100 TABLET, FILM COATED ORAL at 08:14

## 2020-02-04 RX ADMIN — INSULIN LISPRO 2 UNITS: 100 INJECTION, SOLUTION INTRAVENOUS; SUBCUTANEOUS at 12:39

## 2020-02-04 RX ADMIN — HEPARIN SODIUM 5000 UNITS: 5000 INJECTION INTRAVENOUS; SUBCUTANEOUS at 13:31

## 2020-02-04 RX ADMIN — Medication 1 G: at 12:38

## 2020-02-04 RX ADMIN — INSULIN LISPRO 2 UNITS: 100 INJECTION, SOLUTION INTRAVENOUS; SUBCUTANEOUS at 17:15

## 2020-02-04 RX ADMIN — AMLODIPINE BESYLATE 5 MG: 5 TABLET ORAL at 08:14

## 2020-02-04 NOTE — ASSESSMENT & PLAN NOTE
· Monitor  · Avoid nephrotoxins  · Avoid hypotension  · Input and output monitoring  · Nephrologist on board

## 2020-02-04 NOTE — ASSESSMENT & PLAN NOTE
- Patient follows with [facility] cardiology  - known to have mitral valve regurgitation with declining function  - due for echo this week with [facility]

## 2020-02-04 NOTE — ASSESSMENT & PLAN NOTE
Resolved  Due to hyponatremia  Patient complained of weakness, mostly in the legs, on admission  Per my discussion with the patient patient's nurse, patient has been going to and from the bathroom and walking without problems  No need for PT OT evaluation at this point  This was also discussed with the patient and he agrees with this

## 2020-02-04 NOTE — PROGRESS NOTES
Progress Note - Nannette Oliveira 1940, [de-identified] y o  male MRN: [id number]    Unit/Bed#: S -01 Encounter: [id number]    Primary Care Provider: Shantal Valentin DO   Date and time admitted to hospital: 2/1/2020  9:49 PM        * Hyponatremia  Assessment & Plan  · Acute on chronic  · Presented with weakness and sodium of 116  · Status post 1 8% NS as per nephrology  · Continue BMP q6 as per nephrologist   · As per nephrologist, they would like to be informed if the serum sodium is greater than 130 or less than 126  · TSH normal   Serum osmolality 259, urine osmolality 395, urine sodium 83, uric acid 5 8, cortisol level 14  6  · Chest x-ray:  Cardiomegaly have; CT scan of the head:  Negative for pathology  · Exact etiology, not entirely clear  Appears with underlying chronicity, with previous sodium levels in the low 130s  · Nephrologist started in the small dose of loop diuretic (torsemide) and low-dose salt tablets  Chlorthalidone will be permanently discontinued  CAD (coronary artery disease)  Assessment & Plan  Stable  Continue home meds    DM (diabetes mellitus), type 2 [facility])  Assessment & Plan  Lab Results   Component Value Date    HGBA1C 8 4 (H) 08/29/2014       Recent Labs     02/03/20  1625 02/03/20  2132 02/04/20  0743 02/04/20  1050   POCGLU 149* 181* 155* 206*       Blood Sugar Average: Last 72 hrs:  (P) 469 [id number]     · Insulin sliding scale  · monitor fingerstick glucose  · Patient's blood sugars are acceptable levels at this point, although at times blood sugar goes up significantly  · Patient is on a basal bolus insulin treatment at home  · For now, we will restart patient on Lantus, but we will started on a lower dose 1st that her maintenance dose  · Will adjust treatment accordingly  General weakness  Assessment & Plan  Resolved  Due to hyponatremia  Patient complained of weakness, mostly in the legs, on admission    Per my discussion with the patient patient's nurse, patient has been going to and from the bathroom and walking without problems  No need for PT OT evaluation at this point  This was also discussed with the patient and he agrees with this  Hyperlipemia  Assessment & Plan  · Will continue patient's statin medication  Mitral regurgitation  Assessment & Plan  - Patient follows with [facility] cardiology  - known to have mitral valve regurgitation with declining function  - due for echo this week with [facility]    Atrial fibrillation ([address] )  Assessment & Plan  · Patient's INR presently subtherapeutic at 1 87  · Patient's Coumadin was initially held off in the ICU  · It was started again last night  · Monitor PT and INR  · Adjust treatment accordingly  · Presently, patient is also on heparin subcutaneously, thus we will discontinue  Hypertension  Assessment & Plan  Continue amlodipine and metoprolol  Patient's chlorthalidone is on hold  Nephrologist started the patient on torsemide 10 mg once a day today  Chronic kidney disease (CKD), stage IV (severe) ([facility])  Assessment & Plan  · Monitor  · Avoid nephrotoxins  · Avoid hypotension  · Input and output monitoring  · Nephrologist on board  VTE Pharmacologic Prophylaxis:   Pharmacologic: Heparin  Mechanical VTE Prophylaxis in Place: Yes    Patient Centered Rounds: I have performed bedside rounds with nursing staff today  Discussions with Specialists or Other Care Team Provider:  Case management  Education and Discussions with Family / Patient:  Patient  I spoke to the patient's daughter, Meagan Gotti, and discussed with her our findings and plans  I answered questions and concerns  She is okay with the plans  Time Spent for Care: 30 minutes  More than 50% of total time spent on counseling and coordination of care as described above      Current Length of Stay: 2 day(s)    Current Patient Status: Inpatient   Certification Statement: The patient will continue to require additional inpatient hospital stay due to Above findings and plans  Discharge Plan:  None yet  Code Status: Level 1 - Full Code      Subjective:   Patient is doing fine and feels a lot better  In fact, patient told me that his strength is back  Patient denies any generalized weakness anymore  Patient told me that he was able to walk to and from the bathroom without any problems  I also verified this from the patient's nurse  Thus from my discussion with him, he does not think he needs PT OT evaluation when I discussed this with him  Patient also told me that he is safe at home and not alone at home  Patient denies any pains or any shortness of breath  No complaints  In fact, patient asked me if he is going to be discharge today or not  Objective:     Vitals:   Temp (24hrs), Av 7 °F (36 5 °C), Min:97 5 °F (36 4 °C), Max:98 °F (36 7 °C)    Temp:  [97 5 °F (36 4 °C)-98 °F (36 7 °C)] 97 7 °F (36 5 °C)  HR:  [74-84] 82  Resp:  [16-18] 16  BP: (139-159)/(67-78) 159/70  SpO2:  [94 %-95 %] 94 %  There is no height or weight on file to calculate BMI  Input and Output Summary (last 24 hours): Intake/Output Summary (Last 24 hours) at 2020 1251  Last data filed at 2020 0945  Gross per 24 hour   Intake 470 ml   Output 500 ml   Net -30 ml       Physical Exam:     Physical Exam   Constitutional: He is oriented to person, place, and time  No distress  HENT:   Head: Normocephalic and atraumatic  Eyes: Right eye exhibits no discharge  Left eye exhibits no discharge  No scleral icterus  Neck: No JVD present  No tracheal deviation present  No thyromegaly present  Cardiovascular: Normal rate and regular rhythm  Exam reveals no gallop and no friction rub  Murmur heard  Pulmonary/Chest: Effort normal and breath sounds normal  No stridor  No respiratory distress  He has no wheezes  He has no rales  Abdominal: Soft  Bowel sounds are normal  He exhibits no distension and no mass   There is no tenderness  There is no rebound and no guarding  Musculoskeletal: He exhibits edema  He exhibits no tenderness  Positive for bilateral lower extremity edema  Neurological: He is alert and oriented to person, place, and time  Skin: Skin is warm  No rash noted  He is not diaphoretic  No erythema  No pallor  Psychiatric: He has a normal mood and affect  His behavior is normal  Thought content normal    Vitals reviewed  Additional Data:     Labs:    Results from last 7 days   Lab Units 02/04/20  0602  02/01/20  2218   WBC Thousand/uL 6 83  --  6 73   HEMOGLOBIN g/dL 12 7  --  12 0   HEMATOCRIT % 37 2  --  33 8*   PLATELETS Thousands/uL 275   < > 244   NEUTROS PCT %  --   --  64   LYMPHS PCT %  --   --  20   MONOS PCT %  --   --  11   EOS PCT %  --   --  3    < > = values in this interval not displayed  Results from last 7 days   Lab Units 02/04/20  0648  02/01/20  2218   POTASSIUM mmol/L 3 7   < > 3 2*   CHLORIDE mmol/L 91*   < > 81*   CO2 mmol/L 27   < > 25   BUN mg/dL 22   < > 28*   CREATININE mg/dL 1 71*   < > 1 81*   CALCIUM mg/dL 9 0   < > 9 4   ALK PHOS U/L  --   --  163*   ALT U/L  --   --  30   AST U/L  --   --  28    < > = values in this interval not displayed  Results from last 7 days   Lab Units 02/04/20  0648   INR  1 87*       * I Have Reviewed All Lab Data Listed Above  * Additional Pertinent Lab Tests Reviewed: [facility]    Imaging:    Imaging Reports Reviewed Today Include:  Diagnostic imaging studies that were done on this admission  Imaging Personally Reviewed by Myself Includes:  None      Recent Cultures (last 7 days):           Last 24 Hours Medication List:     Current Facility-Administered Medications:  amLODIPine 5 mg Oral Daily Genesis Pisano MD   heparin (porcine) 5,000 Units Subcutaneous [facility] Genesis Pisano MD   insulin lispro 1-6 Units Subcutaneous TID AC Genesis Pisano MD   insulin lispro 1-6 Units Subcutaneous HS Travon Hobbs MD   magnesium sulfate 4 g Intravenous Once CARLOS Gaston   melatonin 3 mg Oral HS Travon Hobbs MD   metoprolol tartrate 100 mg Oral Q12H [address] Travon Hobbs MD   potassium chloride 20 mEq Oral Daily CARLOS Gaston   sodium chloride 1 g Oral TID With Meals CARLOS Gaston   torsemide 10 mg Oral Daily CARLOS Gaston   warfarin 2 5 mg Oral Daily (warfarin) Travon Hobbs MD        Today, Patient Was Seen By: Emmanuel Lombardi MD    ** Please Note: Dragon 360 Dictation voice to text software may have been used in the creation of this document   **

## 2020-02-04 NOTE — ASSESSMENT & PLAN NOTE
Lab Results   Component Value Date    HGBA1C 8 4 (H) 08/29/2014       Recent Labs     02/03/20  1625 02/03/20  2132 02/04/20  0743 02/04/20  1050   POCGLU 149* 181* 155* 206*       Blood Sugar Average: Last 72 hrs:  (P) 721 [id number]     · Insulin sliding scale  · monitor fingerstick glucose  · Patient's blood sugars are acceptable levels at this point, although at times blood sugar goes up significantly  · Patient is on a basal bolus insulin treatment at home  · For now, we will restart patient on Lantus, but we will started on a lower dose 1st that her maintenance dose  · Will adjust treatment accordingly

## 2020-02-04 NOTE — PROGRESS NOTES
[facility]   Sirisha Hi [de-identified] y o  male MRN: [id number]  Unit/Bed#: S -01 Encounter: [id number]  Reason for Consult:  Hyponatremia, CKD    ASSESSMENT and PLAN:  1  Hyponatremia:  · Sodium level 116 on 02/01/2020 requiring ICU admission  · Treated with 3% saline with appropriate improvement in saline, discontinued on 02/02/2020  After which sodium level declined slightly  · Given 1 dose of Lasix 40 mg IV on 02/02/2020 and 1 8% saline started  · Sodium level currently up to 129 which is an appropriate rate of rise but now level has plateaued over the last 6 hours  · Workup:    ? TSH normal, serum osmolality 259, urine osmolality 395, urine sodium 83  Uric acid 5 8  Cortisol level 14 6  ? Chest x-ray shows cardiomegaly  ? CT of the head negative for pathology  · Etiology:  Not entirely clear  Appears to be underlying chronicity with sodium levels in the low 130s  Suspected to be related to increased oral intake in the setting of thiazide diuretic use/CKD  No evidence of thyroid or adrenal abnormality  · Plan:    ? Discontinue 1 8% saline  ? Start small dose of loop diuretic and low-dose salt tablets  Patient will likely need a loop diuretic added to antihypertensive regime since chlorthalidone will be permanently on hold  2  Chronic kidney disease, stage IV:    · Creatinine remains lower than baseline, currently 1 7  Baseline creatinine 2-2 3 mg/dL  · Followed by Dr Reyna Zamudio of [address] [place] [address] Nephrology  · Renal function has been stable  3  Electrolytes:  · Magnesium level 1 0  Give 4 g over 6 hours Mag sulfate  · Hypokalemia:  Resolved  Potassium low normal, currently 3 7  · Adding a loop diuretic therefore will give patient 20 mEq of K-Dur   4  Hypertension/volume status:    · On amlodipine and metoprolol     · Trace/mild lower extremity edema  · Blood pressure acceptable/slightly above goal  · Add torsemide 10 mg daily    DISPOSITION:  Discontinue 1 8% saline  Add small dose of salt tablets an loop diuretic    SUBJECTIVE / INTERVAL HISTORY:  Feels fine  No complaints  Does not like the food therefore has not been eating well      OBJECTIVE:  Current Weight: Weight - Scale: 87 9 kg (193 lb 12 8 oz)  Vitals:    02/03/20 2137 02/03/20 2236 02/04/20 0600 02/04/20 0730   BP: 139/67 155/67  159/70   BP Location: Left arm Left arm     Pulse: 84 74  82   Resp:  18  16   Temp:  98 °F (36 7 °C)  97 7 °F (36 5 °C)   TempSrc:  Probe  Oral   SpO2:  95%  94%   Weight:   87 9 kg (193 lb 12 8 oz)        Intake/Output Summary (Last 24 hours) at 2/4/2020 0108  Last data filed at 2/4/2020 9246  Gross per 24 hour   Intake 440 ml   Output 300 ml   Net 140 ml     General: NAD, comfortably sitting out of bed  Skin: no rash  Eyes: anicteric sclera  ENT: moist mucous membrane  Neck: supple  Chest: CTA b/l, no ronchii, no wheeze, no rubs, no rales  CVS: s1s2, no murmur, no gallop, no rub  Abdomen: soft, nontender, nl sounds  Extremities:  Trace edema LE b/l  : no castillo  Neuro: AAOX3  Psych: normal affect  Medications:    Current Facility-Administered Medications:     amLODIPine (NORVASC) tablet 5 mg, 5 mg, Oral, Daily, Travon Hobbs MD, 5 mg at 02/04/20 0814    heparin (porcine) subcutaneous injection 5,000 Units, 5,000 Units, Subcutaneous, Q8H [address], 5,000 Units at 02/04/20 0543 **AND** [COMPLETED] Platelet count, , , Once, Ele James PA-C    insulin lispro (HumaLOG) 100 units/mL subcutaneous injection 1-6 Units, 1-6 Units, Subcutaneous, TID AC, 2 Units at 02/03/20 1152 **AND** Fingerstick Glucose (POCT), , , TID AC, Travon Hobbs MD    insulin lispro (HumaLOG) 100 units/mL subcutaneous injection 1-6 Units, 1-6 Units, Subcutaneous, HS, Travon Hobbs MD, 1 Units at 02/03/20 2141    magnesium sulfate 4 g/100 mL IVPB (premix) 4 g, 4 g, Intravenous, Once, CARLOS Gaston    melatonin tablet 3 mg, 3 mg, Oral, HS, Travon Hobbs MD, 3 mg at 02/03/20 2141    metoprolol tartrate (LOPRESSOR) tablet 100 mg, 100 mg, Oral, Q12H [facility], Sintia Peña MD, 100 mg at 02/04/20 7884    sodium chloride (concentrated) 308 mEq in sterile water 1,000 mL infusion, , Intravenous, Continuous, Sintia Peña MD, Last Rate: 20 mL/hr at 02/03/20 0932    warfarin (COUMADIN) tablet 2 5 mg, 2 5 mg, Oral, Daily (warfarin), Sintia Peña MD, 2 5 mg at 02/03/20 1734    Laboratory Results:  Results from last 7 days   Lab Units 02/04/20  0648 02/04/20  0602 02/04/20  0601 02/04/20  0119 02/03/20  1845 02/03/20  1151 02/03/20  0803 02/03/20  0408  02/02/20  0254 02/01/20  2218   WBC Thousand/uL  --  6 83  --   --   --   --   --   --   --   --  6 73   HEMOGLOBIN g/dL  --  12 7  --   --   --   --   --   --   --   --  12 0   HEMATOCRIT %  --  37 2  --   --   --   --   --   --   --   --  33 8*   PLATELETS Thousands/uL  --  275  --   --   --   --   --   --   --  236 244   POTASSIUM mmol/L 3 7  --  3 6 3 5 3 6 3 8 3 9 3 8   < > 3 5 3 2*   CHLORIDE mmol/L 91*  --  91* 92* 90* 91* 90* 90*   < > 82* 81*   CO2 mmol/L 27  --  27 26 26 27 27 26   < > 26 25   BUN mg/dL 22  --  22 24 26* 22 22 22   < > 26* 28*   CREATININE mg/dL 1 71*  --  1 70* 1 66* 1 84* 1 53* 1 50* 1 57*   < > 1 77* 1 81*   CALCIUM mg/dL 9 0  --  8 8 9 3 9 4 8 1* 8 4 8 9   < > 9 6 9 4   MAGNESIUM mg/dL  --   --  1 0*  --   --   --   --   --   --   --   --    PHOSPHORUS mg/dL  --   --  2 6  --   --   --   --   --   --   --   --     < > = values in this interval not displayed

## 2020-02-04 NOTE — ASSESSMENT & PLAN NOTE
· Patient's INR presently subtherapeutic at 1 87  · Patient's Coumadin was initially held off in the ICU  · It was started again last night  · Monitor PT and INR  · Adjust treatment accordingly  · Presently, patient is also on heparin subcutaneously, thus we will discontinue

## 2020-02-05 VITALS
DIASTOLIC BLOOD PRESSURE: 78 MMHG | RESPIRATION RATE: 16 BRPM | BODY MASS INDEX: 30.98 KG/M2 | HEART RATE: 88 BPM | OXYGEN SATURATION: 95 % | WEIGHT: 192.8 LBS | SYSTOLIC BLOOD PRESSURE: 151 MMHG | HEIGHT: 66 IN | TEMPERATURE: 98.1 F

## 2020-02-05 PROBLEM — G93.41 ACUTE METABOLIC ENCEPHALOPATHY: Status: ACTIVE | Noted: 2020-02-05

## 2020-02-05 PROBLEM — R53.1 GENERAL WEAKNESS: Status: RESOLVED | Noted: 2020-02-02 | Resolved: 2020-02-05

## 2020-02-05 LAB
ANION GAP SERPL CALCULATED.3IONS-SCNC: 12 MMOL/L (ref 4–13)
BUN SERPL-MCNC: 28 MG/DL (ref 5–25)
CALCIUM SERPL-MCNC: 9.7 MG/DL (ref 8.3–10.1)
CHLORIDE SERPL-SCNC: 95 MMOL/L (ref 100–108)
CO2 SERPL-SCNC: 26 MMOL/L (ref 21–32)
CREAT SERPL-MCNC: 1.92 MG/DL (ref 0.6–1.3)
GFR SERPL CREATININE-BSD FRML MDRD: 32 ML/MIN/1.73SQ M
GLUCOSE SERPL-MCNC: 132 MG/DL (ref 65–140)
GLUCOSE SERPL-MCNC: 135 MG/DL (ref 65–140)
GLUCOSE SERPL-MCNC: 291 MG/DL (ref 65–140)
INR PPP: 1.93 (ref 0.84–1.19)
POTASSIUM SERPL-SCNC: 3.6 MMOL/L (ref 3.5–5.3)
PROTHROMBIN TIME: 21.2 SECONDS (ref 11.6–14.5)
SODIUM SERPL-SCNC: 133 MMOL/L (ref 136–145)

## 2020-02-05 PROCEDURE — 85610 PROTHROMBIN TIME: CPT | Performed by: INTERNAL MEDICINE

## 2020-02-05 PROCEDURE — 99232 SBSQ HOSP IP/OBS MODERATE 35: CPT | Performed by: INTERNAL MEDICINE

## 2020-02-05 PROCEDURE — 80048 BASIC METABOLIC PNL TOTAL CA: CPT | Performed by: INTERNAL MEDICINE

## 2020-02-05 PROCEDURE — 99239 HOSP IP/OBS DSCHRG MGMT >30: CPT | Performed by: INTERNAL MEDICINE

## 2020-02-05 PROCEDURE — 82948 REAGENT STRIP/BLOOD GLUCOSE: CPT

## 2020-02-05 RX ORDER — LOSARTAN POTASSIUM 100 MG/1
50 TABLET ORAL DAILY
Qty: 30 TABLET | Refills: 0 | Status: SHIPPED | OUTPATIENT
Start: 2020-02-05 | End: 2020-10-13 | Stop reason: HOSPADM

## 2020-02-05 RX ORDER — SODIUM CHLORIDE 1000 MG
1 TABLET, SOLUBLE MISCELLANEOUS
Qty: 90 TABLET | Refills: 0 | Status: SHIPPED | OUTPATIENT
Start: 2020-02-05 | End: 2020-10-13 | Stop reason: HOSPADM

## 2020-02-05 RX ORDER — AMLODIPINE BESYLATE 5 MG/1
5 TABLET ORAL DAILY
Refills: 0
Start: 2020-02-06 | End: 2020-10-13 | Stop reason: HOSPADM

## 2020-02-05 RX ORDER — POTASSIUM CHLORIDE 20 MEQ/1
20 TABLET, EXTENDED RELEASE ORAL DAILY
Qty: 30 TABLET | Refills: 0 | Status: SHIPPED | OUTPATIENT
Start: 2020-02-06 | End: 2020-10-13 | Stop reason: HOSPADM

## 2020-02-05 RX ORDER — TORSEMIDE 10 MG/1
10 TABLET ORAL DAILY
Qty: 30 TABLET | Refills: 0 | Status: SHIPPED | OUTPATIENT
Start: 2020-02-06

## 2020-02-05 RX ADMIN — POTASSIUM CHLORIDE 20 MEQ: 1500 TABLET, EXTENDED RELEASE ORAL at 08:59

## 2020-02-05 RX ADMIN — INSULIN LISPRO 4 UNITS: 100 INJECTION, SOLUTION INTRAVENOUS; SUBCUTANEOUS at 12:36

## 2020-02-05 RX ADMIN — TORSEMIDE 10 MG: 20 TABLET ORAL at 08:58

## 2020-02-05 RX ADMIN — METOPROLOL TARTRATE 100 MG: 100 TABLET, FILM COATED ORAL at 08:59

## 2020-02-05 RX ADMIN — AMLODIPINE BESYLATE 5 MG: 5 TABLET ORAL at 08:58

## 2020-02-05 RX ADMIN — Medication 1 G: at 08:58

## 2020-02-05 RX ADMIN — Medication 1 G: at 12:34

## 2020-02-05 RX ADMIN — HEPARIN SODIUM 5000 UNITS: 5000 INJECTION INTRAVENOUS; SUBCUTANEOUS at 06:47

## 2020-02-05 NOTE — DISCHARGE SUMMARY
Discharge- Nannette Oliveira 1940, [de-identified] y o  male MRN: [id number]    Unit/Bed#: S -01 Encounter: [id number]    Primary Care Provider: Shantal Valentin DO   Date and time admitted to hospital: 2/1/2020  9:49 PM        * Hyponatremia  Assessment & Plan  · Improved significantly  · Present sodium level is 133  · Acute on chronic  · Presented with weakness and sodium of 116  · Status post 1 8% NS as per nephrology   · TSH normal   Serum osmolality 259, urine osmolality 395, urine sodium 83, uric acid 5 8, cortisol level 14  6  · Chest x-ray:  Cardiomegaly have; CT scan of the head:  Negative for pathology  · Exact etiology, not entirely clear  Appears with underlying chronicity, with previous sodium levels in the low 130s  · Nephrologist started a small dose of loop diuretic (torsemide) and salt tablets  Chlorthalidone will be permanently discontinued  · As per discussion with nephrologist, patient may be discharged from his standpoint  Patient will continue with torsemide, salt tablets and continue with fluid restriction at 1 5 L per day  Patient will not be on sodium bicarbonate tablets anymore at this point  A repeat BMP script was given to the patient to be done February 10, 2020 and results should be sent to patient's primary care physician and nephrologist   These were discussed with the patient and patient's granddaughter at bedside    Acute metabolic encephalopathy  Assessment & Plan  · Resolved  · Present on admission with change in mental status  · Due to previous significant hyponatremia, improved  · Outpatient follow-up with primary care physician  CAD (coronary artery disease)  Assessment & Plan  Stable  Continue home meds  Outpatient follow-up with primary care physician      DM (diabetes mellitus), type 2 [facility])  Assessment & Plan  Lab Results   Component Value Date    HGBA1C 8 4 (H) 08/29/2014       Recent Labs     02/04/20 2026 02/04/20  2142 02/05/20  0719 02/05/20  1108 POCGLU 217* 220* 135 291*       Blood Sugar Average: Last 72 hrs:  (P) 193 375     · Insulin sliding scale and long-acting Lantus while here in the hospital   · Status post monitor fingerstick glucose while here in the hospital   · From our discussion with the patient, his blood sugars at home with his prescribed insulin doses have been with good control  Thus patient will continue with his previous doses of his Levemir and Humalog (patient takes Levemir 20 units once a day, Humalog 10 units at breakfast, 14 units at lunch and 18 units at supper)  · Outpatient follow-up with primary care physician  Hyperlipemia  Assessment & Plan  · Continue patient's statin medication  · Outpatient follow-up with primary care physician  Mitral regurgitation  Assessment & Plan  - Patient follows with [facility] cardiology  - known to have mitral valve regurgitation with declining function  - due for echo this week with [facility]  Outpatient follow-up with primary care physician and his cardiologist     Atrial fibrillation [facility])  Assessment & Plan  · Patient's INR presently 1 93   · Patient's Coumadin was initially held off in the ICU  · Continue with patient's Coumadin dose at 2 5 mg once a day  · Monitor PT and INR in the outpatient  · Adjust treatment accordingly  · Outpatient follow-up with primary care physician  I am recommending a repeat PT and INR in 1 to 2 weeks  Patient's primary care physician may facilitate patient having this test     Hypertension  Assessment & Plan  As per nephrologist, patient will Continue with his amlodipine and metoprolol doses at home  However, as per nephrologist, his losartan will be decreased to 50 mg once a day instead of 100 mg once a day  Patient's chlorthalidone will not be continued  Nephrologist started the patient on torsemide 10 mg once a day and will continue    All of these changes in the medications were discussed with the patient and patient's granddaughter at bedside  Outpatient follow-up with primary care physician  Chronic kidney disease (CKD), stage IV (severe) ([facility])  Assessment & Plan  · Stable  · Monitor  · Avoid nephrotoxins  · Avoid hypotension  · Status post Input and output monitoring  · Nephrologist on board  · As per nephrologist, he is okay with discharge of the patient today  · Outpatient follow-up with primary care physician and his nephrologist     General weaknessresolved as of 2/5/2020  Assessment & Plan  Resolved  Due to hyponatremia  Patient complained of weakness, mostly in the legs, on admission  Per my discussion with the patient patient's nurse, patient has been going to and from the bathroom and walking without problems  No need for PT OT evaluation at this point  This was also discussed with the patient and he agrees with this  Outpatient follow-up with primary care physician  Discharging Physician / Practitioner: Chacha Stanford MD  PCP: Demi Talamantes DO  Admission Date:   Admission Orders (From admission, onward)     Ordered        02/02/20 0201  Inpatient Admission  Once                   Discharge Date: 02/05/20    Resolved Problems  Date Reviewed: 2/5/2020          Resolved    General weakness 2/5/2020     Resolved by  Chacha Stanford MD          Consultations During Hospital Stay:  · Nephrologist     Procedures Performed:     · Please see diagnosis and notes above  Significant Findings / Test Results:     · Please see diagnosis and notes above  Incidental Findings:   · CT scan finding: Near total opacification of the left maxillary sinus   Opacification of several anterior left ethmoid air cells  Outpatient follow-up with primary care physician  Test Results Pending at Discharge (will require follow up): · None  Outpatient Tests Requested:  · Repeat BMP, February 10, 2020  Results should be sent to patient's primary care physician and nephrologist     Complications:  None      Reason for Admission:  Generalized weakness  Hospital Course:     Faraz Yusuf is a [de-identified] y o  male patient who originally presented to the hospital on 2/1/2020 due to generalized weakness  On this admission, patient was found to have severe hyponatremia  Patient was initially admitted in our ICU  Nephrologist was on board  Patient was given hypertonic IV fluids  Patient was fluid restricted  BMP was monitored closely  Patient's severe hyponatremia and generalized weakness have both resolved  Patient's hypertonic IV fluid was discontinued  Patient was then placed on torsemide as well as salt tablets  Patient's discharge serum sodium is now 133  Patient's condition improved  As per nephrologist, he is okay with discharge of the patient today  Patient will continue to be on torsemide and salt tablets  Patient will not be on sodium bicarbonate tablets or his chlorthalidone  Patient's losartan was decreased in dose to 50 mg once a day (instead of 100 mg once a day)  Fluid restriction at 1 5 L will continue at home  For details about patient's diagnosis, workups, management, hospital course and discharge plans, please see above list of diagnoses and related notes  Condition at Discharge: stable     Discharge Day Visit / Exam:     Subjective:    Patient is doing fine and feels a lot better  Patient does not have any complaints and denies any symptoms  Patient has generalized weakness had resolved  In fact, patient has been walking here without any problems  Patient denies any pains or any shortness of breath    Patient is okay with his discharge to home today      Vitals: Blood Pressure: 151/78 (02/05/20 0716)  Pulse: 88 (02/05/20 0716)  Temperature: 98 1 °F (36 7 °C) (02/05/20 0716)  Temp Source: Oral (02/05/20 0716)  Respirations: 16 (02/05/20 0716)  Height: 5' 6" (167 6 cm) (02/04/20 1649)  Weight - Scale: 87 5 kg (192 lb 12 8 oz) (02/05/20 0600)  SpO2: 95 % (02/05/20 0716)  Exam:   Physical Exam   Constitutional: He is oriented to person, place, and time  No distress  HENT:   Head: Normocephalic and atraumatic  Eyes: Right eye exhibits no discharge  Left eye exhibits no discharge  No scleral icterus  Neck: No JVD present  No tracheal deviation present  No thyromegaly present  Cardiovascular: Normal rate, regular rhythm and normal heart sounds  Exam reveals no gallop and no friction rub  No murmur heard  Pulmonary/Chest: Effort normal and breath sounds normal  No stridor  No respiratory distress  He has no wheezes  He has no rales  Abdominal: Soft  Bowel sounds are normal  He exhibits no distension and no mass  There is no tenderness  There is no rebound and no guarding  Musculoskeletal: He exhibits edema  He exhibits no tenderness or deformity  Positive for mild/trace bilateral lower extremity edema  Neurological: He is alert and oriented to person, place, and time  Skin: Skin is warm  No rash noted  He is not diaphoretic  No erythema  No pallor  Psychiatric: He has a normal mood and affect  His behavior is normal  Thought content normal    Pleasant  Vitals reviewed  Discussion with Family:  I spoke to the patient's granddaughter (works at [email or web address]), and discussed with her our findings and discharge plans  I answered questions and concerns  She is okay with the discharge plans  She is okay with the discharge of her her grand father today to home  Discharge instructions/Information to patient and family:   See after visit summary for information provided to patient and family  Provisions for Follow-Up Care:  See after visit summary for information related to follow-up care and any pertinent home health orders  Disposition:     Home    For Discharges to [facility]:   · Not Applicable to this Patient - Not Applicable to this Patient    Planned Readmission:  None  Discharge Statement:  I spent 45 minutes discharging the patient  This time was spent on the day of discharge  I had direct contact with the patient on the day of discharge  Greater than 50% of the total time was spent examining patient, answering all patient questions, arranging and discussing plan of care with patient as well as directly providing post-discharge instructions  Additional time then spent on discharge activities  Discharge Medications:  See after visit summary for reconciled discharge medications provided to patient and family        ** Please Note: This note has been constructed using a voice recognition system **

## 2020-02-05 NOTE — ASSESSMENT & PLAN NOTE
· Stable  · Monitor  · Avoid nephrotoxins  · Avoid hypotension  · Status post Input and output monitoring  · Nephrologist on board  · As per nephrologist, he is okay with discharge of the patient today    · Outpatient follow-up with primary care physician and his nephrologist

## 2020-02-05 NOTE — PLAN OF CARE
Problem: Potential for Falls  Goal: Patient will remain free of falls  Description  INTERVENTIONS:  - Assess patient frequently for physical needs  -  Identify cognitive and physical deficits and behaviors that affect risk of falls    -  [place] fall precautions as indicated by assessment   - Educate patient/family on patient safety including physical limitations  - Instruct patient to call for assistance with activity based on assessment  - Modify environment to reduce risk of injury  - Consider OT/PT consult to assist with strengthening/mobility  Outcome: Progressing     Problem: MUSCULOSKELETAL - ADULT  Goal: Maintain or return mobility to safest level of function  Description  INTERVENTIONS:  - Assess patient's ability to carry out ADLs; assess patient's baseline for ADL function and identify physical deficits which impact ability to perform ADLs (bathing, care of mouth/teeth, toileting, grooming, dressing, etc )  - Assess/evaluate cause of self-care deficits   - Assess range of motion  - Assess patient's mobility  - Assess patient's need for assistive devices and provide as appropriate  - Encourage maximum independence but intervene and supervise when necessary  - Involve family in performance of ADLs  - Assess for home care needs following discharge   - Consider OT consult to assist with ADL evaluation and planning for discharge  - Provide patient education as appropriate  Outcome: Progressing     Problem: METABOLIC, FLUID AND ELECTROLYTES - ADULT  Goal: Electrolytes maintained within normal limits  Description  INTERVENTIONS:  - Monitor labs and assess patient for signs and symptoms of electrolyte imbalances  - Administer electrolyte replacement as ordered  - Monitor response to electrolyte replacements, including repeat lab results as appropriate  - Instruct patient on fluid and nutrition as appropriate  Outcome: Progressing  Goal: Fluid balance maintained  Description  INTERVENTIONS:  - Monitor labs   - Monitor I/O and WT  - Instruct patient on fluid and nutrition as appropriate  - Assess for signs & symptoms of volume excess or deficit  Outcome: Progressing  Goal: Glucose maintained within target range  Description  INTERVENTIONS:  - Monitor Blood Glucose as ordered  - Assess for signs and symptoms of hyperglycemia and hypoglycemia  - Administer ordered medications to maintain glucose within target range  - Assess nutritional intake and initiate nutrition service referral as needed  Outcome: Progressing

## 2020-02-05 NOTE — ASSESSMENT & PLAN NOTE
Resolved  Due to hyponatremia  Patient complained of weakness, mostly in the legs, on admission  Per my discussion with the patient patient's nurse, patient has been going to and from the bathroom and walking without problems  No need for PT OT evaluation at this point  This was also discussed with the patient and he agrees with this  Outpatient follow-up with primary care physician

## 2020-02-05 NOTE — ASSESSMENT & PLAN NOTE
As per nephrologist, patient will Continue with his amlodipine and metoprolol doses at home  However, as per nephrologist, his losartan will be decreased to 50 mg once a day instead of 100 mg once a day  Patient's chlorthalidone will not be continued  Nephrologist started the patient on torsemide 10 mg once a day and will continue  All of these changes in the medications were discussed with the patient and patient's granddaughter at bedside  Outpatient follow-up with primary care physician

## 2020-02-05 NOTE — PLAN OF CARE
Problem: Potential for Falls  Goal: Patient will remain free of falls  Description  INTERVENTIONS:  - Assess patient frequently for physical needs  -  Identify cognitive and physical deficits and behaviors that affect risk of falls    -  [place] fall precautions as indicated by assessment   - Educate patient/family on patient safety including physical limitations  - Instruct patient to call for assistance with activity based on assessment  - Modify environment to reduce risk of injury  - Consider OT/PT consult to assist with strengthening/mobility  2/5/2020 1408 by [employer]  Outcome: Adequate for Discharge  2/5/2020 0949 by [employer]  Outcome: Progressing     Problem: MUSCULOSKELETAL - ADULT  Goal: Maintain or return mobility to safest level of function  Description  INTERVENTIONS:  - Assess patient's ability to carry out ADLs; assess patient's baseline for ADL function and identify physical deficits which impact ability to perform ADLs (bathing, care of mouth/teeth, toileting, grooming, dressing, etc )  - Assess/evaluate cause of self-care deficits   - Assess range of motion  - Assess patient's mobility  - Assess patient's need for assistive devices and provide as appropriate  - Encourage maximum independence but intervene and supervise when necessary  - Involve family in performance of ADLs  - Assess for home care needs following discharge   - Consider OT consult to assist with ADL evaluation and planning for discharge  - Provide patient education as appropriate  2/5/2020 1408 by [employer]  Outcome: Adequate for Discharge  2/5/2020 0949 by [employer]  Outcome: Progressing  Goal: Maintain proper alignment of affected body part  Description  INTERVENTIONS:  - Support, maintain and protect limb and body alignment  - Provide patient/ family with appropriate education  Outcome: Adequate for Discharge     Problem: METABOLIC, FLUID AND ELECTROLYTES - ADULT  Goal: Electrolytes maintained within normal limits  Description  INTERVENTIONS:  - Monitor labs and assess patient for signs and symptoms of electrolyte imbalances  - Administer electrolyte replacement as ordered  - Monitor response to electrolyte replacements, including repeat lab results as appropriate  - Instruct patient on fluid and nutrition as appropriate  2/5/2020 1408 by Marlena Kellogg  Outcome: Adequate for Discharge  2/5/2020 0949 by Marlena Kellogg  Outcome: Progressing  Goal: Fluid balance maintained  Description  INTERVENTIONS:  - Monitor labs   - Monitor I/O and WT  - Instruct patient on fluid and nutrition as appropriate  - Assess for signs & symptoms of volume excess or deficit  2/5/2020 1408 by Marlena Kellogg  Outcome: Adequate for Discharge  2/5/2020 0949 by Marlena Kellogg  Outcome: Progressing  Goal: Glucose maintained within target range  Description  INTERVENTIONS:  - Monitor Blood Glucose as ordered  - Assess for signs and symptoms of hyperglycemia and hypoglycemia  - Administer ordered medications to maintain glucose within target range  - Assess nutritional intake and initiate nutrition service referral as needed  2/5/2020 1408 by Marlena Kellogg  Outcome: Adequate for Discharge  2/5/2020 [facility] by Marlena Kellogg  Outcome: Progressing

## 2020-02-05 NOTE — ASSESSMENT & PLAN NOTE
Lab Results   Component Value Date    HGBA1C 8 4 (H) 08/29/2014       Recent Labs     02/04/20 2026 02/04/20  2142 02/05/20  0719 02/05/20  1108   POCGLU 217* 220* 135 291*       Blood Sugar Average: Last 72 hrs:  (P) 193 375     · Insulin sliding scale and long-acting Lantus while here in the hospital   · Status post monitor fingerstick glucose while here in the hospital   · From our discussion with the patient, his blood sugars at home with his prescribed insulin doses have been with good control  Thus patient will continue with his previous doses of his Levemir and Humalog (patient takes Levemir 20 units once a day, Humalog 10 units at breakfast, 14 units at lunch and 18 units at supper)  · Outpatient follow-up with primary care physician

## 2020-02-05 NOTE — ASSESSMENT & PLAN NOTE
· Resolved  · Present on admission with change in mental status  · Due to previous significant hyponatremia, improved  · Outpatient follow-up with primary care physician

## 2020-02-05 NOTE — ASSESSMENT & PLAN NOTE
- Patient follows with [facility] cardiology  - known to have mitral valve regurgitation with declining function  - due for echo this week with [facility]  Outpatient follow-up with primary care physician and his cardiologist

## 2020-02-05 NOTE — INCIDENTAL FINDINGS
The following findings require follow up:  Radiographic finding   Finding:  Near-total opacification of the left maxillary sinus  Opacification of several anterior left ethmoid air cells  Follow up required:  Outpatient follow-up with primary care physician     Follow up should be done within 2 week(s)    Please notify the following clinician to assist with the follow up:   Dr Sujit Bills

## 2020-02-05 NOTE — PROGRESS NOTES
[facility]   Victoriano Osei [de-identified] y o  male MRN: [id number]  Unit/Bed#: S -01 Encounter: [id number]  Reason for Consult:  Hyponatremia    ASSESSMENT and PLAN:  1  Hyponatremia:  · Sodium level 116 on 02/01/2020 requiring ICU admission  · Treated with 3% saline with appropriate improvement in saline, discontinued on 02/02/2020  Car [place] which sodium level declined slightly     · Given 1 dose of Lasix 40 mg IV on 02/02/2020 and 1 8% saline started  · Sodium level appropriately increased  1 8% discontinued on 02/04/2020  Loop diuretic started and sodium chloride tablets  · Sodium level has improved to 133  · Workup:    ? TSH normal, serum osmolality 259, urine osmolality 395, urine sodium 83  Uric acid 5 8   Cortisol level 14 6    ? Chest x-ray shows cardiomegaly  ? CT of the head negative for pathology  · Etiology:  Not entirely clear   Appears to be underlying chronicity with sodium levels in the low 130s   Suspected to be related to increased oral intake in the setting of thiazide diuretic use/CKD   No evidence of thyroid or adrenal abnormality    · Plan:    ? Continue torsemide 10 mg daily  ? Sodium chloride tablets 1 g t i d   ? Follow-up blood work weekly  If sodium level improves and normalizes salt tablets can be weaned and hopefully discontinued  ? No further thiazide diuretics- this should be listed as a medication intolerant  ? Follow-up with Dr Anirudh Costa  2  Chronic kidney disease, stage IV:    · Creatinine remains lower than baseline, slight bump to 1 9 after starting diuretic but acceptable     · Baseline creatinine 2-2 3 mg/dL     · Followed by Dr Stephanie Vail [facility] Nephrology     · Renal function has been stable  3  Electrolytes:  · Electrolytes stable  · Continue daily dose of K-Dur  4  Hypertension/volume status:    · On amlodipine and metoprolol     · Trace/mild lower extremity edema  · Continue torsemide 10 mg daily  · Blood pressure acceptable/slightly above goal but continue to monitor with the addition of diuretic    DISPOSITION:  Stable for discharge    SUBJECTIVE / INTERVAL HISTORY:  No complaints  Patient feeling fine at this time  He remains concerned about his wife who is living in a nursing facility and has advanced dementia      OBJECTIVE:  Current Weight: Weight - Scale: 87 5 kg (192 lb 12 8 oz)  Vitals:    02/04/20 1649 02/04/20 2100 02/05/20 0600 02/05/20 0716   BP: 146/67 143/71  151/78   BP Location: Left arm Right arm  Left arm   Pulse: 64 68  88   Resp: 18 19  16   Temp: 98 4 °F (36 9 °C) 98 1 °F (36 7 °C)  98 1 °F (36 7 °C)   TempSrc: Oral Oral  Oral   SpO2: 97% 97%  95%   Weight: 87 9 kg (193 lb 12 6 oz)  87 5 kg (192 lb 12 8 oz)    Height: 5' 6" (1 676 m)          Intake/Output Summary (Last 24 hours) at 2/5/2020 1211  Last data filed at 2/5/2020 0901  Gross per 24 hour   Intake 240 ml   Output 1450 ml   Net -1210 ml     General: NAD  Skin: no rash  Eyes: anicteric sclera  ENT: moist mucous membrane  Neck: supple  Chest: CTA b/l, no ronchii, no wheeze, no rubs, no rales  CVS: s1s2, no murmur, no gallop, no rub  Abdomen: soft, nontender, nl sounds  Extremities: no edema LE b/l  : no castillo  Neuro: AAOX3  Psych: normal affect  Medications:    Current Facility-Administered Medications:     amLODIPine (NORVASC) tablet 5 mg, 5 mg, Oral, Daily, Senia Blount MD, 5 mg at 02/05/20 0858    heparin (porcine) subcutaneous injection 5,000 Units, 5,000 Units, Subcutaneous, Q8H [facility], 5,000 Units at 02/05/20 0647 **AND** [COMPLETED] Platelet count, , , Once, Shon Seals PA-C    insulin lispro (HumaLOG) 100 units/mL subcutaneous injection 1-6 Units, 1-6 Units, Subcutaneous, TID AC, 2 Units at 02/04/20 1715 **AND** Fingerstick Glucose (POCT), , , TID AC, Senia Blount MD    insulin lispro (HumaLOG) 100 units/mL subcutaneous injection 1-6 Units, 1-6 Units, Subcutaneous, HS, Senia Blount MD, 2 Units at 02/04/20 2146    melatonin tablet 3 mg, 3 mg, Oral, HS, Dominick Ashraf Gina Best MD, 3 mg at 02/04/20 2145    metoprolol tartrate (LOPRESSOR) tablet 100 mg, 100 mg, Oral, Q12H [address], Claudia Thomas MD, 100 mg at 02/05/20 0859    potassium chloride (K-DUR,KLOR-CON) CR tablet 20 mEq, 20 mEq, Oral, Daily, Chris Balm, CRNP, 20 mEq at 02/05/20 3630    sodium chloride tablet 1 g, 1 g, Oral, TID With Meals, Chris Balm, CRNP, 1 g at 02/05/20 0858    torsemide [facility]) tablet 10 mg, 10 mg, Oral, Daily, Chris Balm, CRNP, 10 mg at 02/05/20 3346    warfarin (COUMADIN) tablet 2 5 mg, 2 5 mg, Oral, Daily (warfarin), Claudia Thomas MD, 2 5 mg at 02/04/20 1715    Laboratory Results:  Results from last 7 days   Lab Units 02/05/20  0607 02/04/20  1409 02/04/20  0648 02/04/20  0602 02/04/20  0601 02/04/20  0119 02/03/20  1845 02/03/20  1151  02/02/20  0254 02/01/20  2218   WBC Thousand/uL  --   --   --  6 83  --   --   --   --   --   --  6 73   HEMOGLOBIN g/dL  --   --   --  12 7  --   --   --   --   --   --  12 0   HEMATOCRIT %  --   --   --  37 2  --   --   --   --   --   --  33 8*   PLATELETS Thousands/uL  --   --   --  275  --   --   --   --   --  236 244   POTASSIUM mmol/L 3 6 3 9 3 7  --  3 6 3 5 3 6 3 8   < > 3 5 3 2*   CHLORIDE mmol/L 95* 92* 91*  --  91* 92* 90* 91*   < > 82* 81*   CO2 mmol/L 26 25 27  --  27 26 26 27   < > 26 25   BUN mg/dL 28* 25 22  --  22 24 26* 22   < > 26* 28*   CREATININE mg/dL 1 92* 1 84* 1 71*  --  1 70* 1 66* 1 84* 1 53*   < > 1 77* 1 81*   CALCIUM mg/dL 9 7 9 4 9 0  --  8 8 9 3 9 4 8 1*   < > 9 6 9 4   MAGNESIUM mg/dL  --   --   --   --  1 0*  --   --   --   --   --   --    PHOSPHORUS mg/dL  --   --   --   --  2 6  --   --   --   --   --   --     < > = values in this interval not displayed

## 2020-02-05 NOTE — ASSESSMENT & PLAN NOTE
· Improved significantly  · Present sodium level is 133  · Acute on chronic  · Presented with weakness and sodium of 116  · Status post 1 8% NS as per nephrology   · TSH normal   Serum osmolality 259, urine osmolality 395, urine sodium 83, uric acid 5 8, cortisol level 14  6  · Chest x-ray:  Cardiomegaly have; CT scan of the head:  Negative for pathology  · Exact etiology, not entirely clear  Appears with underlying chronicity, with previous sodium levels in the low 130s  · Nephrologist started a small dose of loop diuretic (torsemide) and salt tablets  Chlorthalidone will be permanently discontinued  · As per discussion with nephrologist, patient may be discharged from his standpoint  Patient will continue with torsemide, salt tablets and continue with fluid restriction at 1 5 L per day  Patient will not be on sodium bicarbonate tablets anymore at this point    A repeat BMP script was given to the patient to be done February 10, 2020 and results should be sent to patient's primary care physician and nephrologist   These were discussed with the patient and patient's granddaughter at bedside

## 2020-02-05 NOTE — ASSESSMENT & PLAN NOTE
· Patient's INR presently 1 93   · Patient's Coumadin was initially held off in the ICU  · Continue with patient's Coumadin dose at 2 5 mg once a day  · Monitor PT and INR in the outpatient  · Adjust treatment accordingly  · Outpatient follow-up with primary care physician  I am recommending a repeat PT and INR in 1 to 2 weeks    Patient's primary care physician may facilitate patient having this test

## 2020-02-05 NOTE — DISCHARGE INSTR - AVS FIRST PAGE
Dear Morris Krishna,     It was our pleasure to care for you here at [facility]  It is our hope that we were always able to exceed the expected standards for your care during your stay  You were hospitalized due to significantly low sodium  You were cared for on the 2nd floor under the service of Bruce Templeton MD with the [facility] who covers for your primary care physician (PCP), Kelsie Barnes DO, while you were hospitalized  If you have any questions or concerns related to this hospitalization, you may contact us at [phone]  For follow up as well as medication refills, we recommend that you follow up with your primary care physician  A registered nurse will reach out to you by phone within a few days after your discharge to answer any additional questions that you may have after going home  However, at this time we provide for you here, the most important instructions / recommendations at discharge:     · Notable Medication Adjustments -   · Your losartan dose was decreased to 50 mg once a day (from 100 mg once a day)  Your chlorthalidone and sodium bicarbonate tablets were discontinued  You will now be on Torsemide medication (a water/diuretic pill)  You will also be on salt and potassium tablets  Continue with your amlodipine medication at home  May continue with 2 5 mg of Coumadin per day  · Testing Required after Discharge -   · You will have a repeat blood exam (BMP) on February 10, 2020  Results should be sent to your primary care physician and kidney doctor  · Important follow up information -   · Outpatient follow-up with your primary care physician and kidney doctor in 1 week  · Other Instructions -   · Fluid restriction:  1 5 L per day    · Please review this entire after visit summary as additional general instructions including medication list, appointments, activity, diet, any pertinent wound care, and other additional recommendations from your care team that may be provided for you        Sincerely,     Shayne Lopez MD

## 2020-10-09 ENCOUNTER — HOSPITAL ENCOUNTER (INPATIENT)
Facility: HOSPITAL | Age: 80
LOS: 4 days | Discharge: HOME | DRG: 644 | End: 2020-10-13
Attending: EMERGENCY MEDICINE | Admitting: INTERNAL MEDICINE
Payer: MEDICARE

## 2020-10-09 DIAGNOSIS — N17.9 ACUTE KIDNEY INJURY (HCC): ICD-10-CM

## 2020-10-09 DIAGNOSIS — E87.1 HYPONATREMIA: Primary | ICD-10-CM

## 2020-10-09 DIAGNOSIS — R59.0 MEDIASTINAL LYMPHADENOPATHY: ICD-10-CM

## 2020-10-09 PROBLEM — I50.22 CHRONIC SYSTOLIC HEART FAILURE (HCC): Status: ACTIVE | Noted: 2020-10-09

## 2020-10-09 PROBLEM — N18.9 ACUTE KIDNEY INJURY SUPERIMPOSED ON CHRONIC KIDNEY DISEASE (HCC): Status: ACTIVE | Noted: 2020-10-09

## 2020-10-09 LAB
ALBUMIN SERPL BCP-MCNC: 3.2 G/DL (ref 3.5–5)
ALP SERPL-CCNC: 142 U/L (ref 46–116)
ALT SERPL W P-5'-P-CCNC: 66 U/L (ref 12–78)
ANION GAP SERPL CALCULATED.3IONS-SCNC: 9 MMOL/L (ref 4–13)
ANION GAP SERPL CALCULATED.3IONS-SCNC: 9 MMOL/L (ref 4–13)
AST SERPL W P-5'-P-CCNC: 60 U/L (ref 5–45)
BASOPHILS # BLD AUTO: 0.05 THOUSANDS/ΜL (ref 0–0.1)
BASOPHILS NFR BLD AUTO: 1 % (ref 0–1)
BILIRUB SERPL-MCNC: 1.61 MG/DL (ref 0.2–1)
BUN SERPL-MCNC: 45 MG/DL (ref 5–25)
BUN SERPL-MCNC: 48 MG/DL (ref 5–25)
CALCIUM ALBUM COR SERPL-MCNC: 9.3 MG/DL (ref 8.3–10.1)
CALCIUM SERPL-MCNC: 8.7 MG/DL (ref 8.3–10.1)
CALCIUM SERPL-MCNC: 8.7 MG/DL (ref 8.3–10.1)
CHLORIDE SERPL-SCNC: 91 MMOL/L (ref 100–108)
CHLORIDE SERPL-SCNC: 92 MMOL/L (ref 100–108)
CO2 SERPL-SCNC: 24 MMOL/L (ref 21–32)
CO2 SERPL-SCNC: 24 MMOL/L (ref 21–32)
CREAT SERPL-MCNC: 3.39 MG/DL (ref 0.6–1.3)
CREAT SERPL-MCNC: 3.49 MG/DL (ref 0.6–1.3)
EOSINOPHIL # BLD AUTO: 0.14 THOUSAND/ΜL (ref 0–0.61)
EOSINOPHIL NFR BLD AUTO: 3 % (ref 0–6)
ERYTHROCYTE [DISTWIDTH] IN BLOOD BY AUTOMATED COUNT: 16.6 % (ref 11.6–15.1)
GFR SERPL CREATININE-BSD FRML MDRD: 16 ML/MIN/1.73SQ M
GFR SERPL CREATININE-BSD FRML MDRD: 16 ML/MIN/1.73SQ M
GLUCOSE SERPL-MCNC: 139 MG/DL (ref 65–140)
GLUCOSE SERPL-MCNC: 155 MG/DL (ref 65–140)
GLUCOSE SERPL-MCNC: 170 MG/DL (ref 65–140)
HCT VFR BLD AUTO: 34.4 % (ref 36.5–49.3)
HGB BLD-MCNC: 11.1 G/DL (ref 12–17)
HOLD SPECIMEN: NORMAL
IMM GRANULOCYTES # BLD AUTO: 0.02 THOUSAND/UL (ref 0–0.2)
IMM GRANULOCYTES NFR BLD AUTO: 0 % (ref 0–2)
INR PPP: 3.45 (ref 0.84–1.19)
LIPASE SERPL-CCNC: 203 U/L (ref 73–393)
LYMPHOCYTES # BLD AUTO: 0.96 THOUSANDS/ΜL (ref 0.6–4.47)
LYMPHOCYTES NFR BLD AUTO: 19 % (ref 14–44)
MCH RBC QN AUTO: 32.2 PG (ref 26.8–34.3)
MCHC RBC AUTO-ENTMCNC: 32.3 G/DL (ref 31.4–37.4)
MCV RBC AUTO: 100 FL (ref 82–98)
MONOCYTES # BLD AUTO: 0.72 THOUSAND/ΜL (ref 0.17–1.22)
MONOCYTES NFR BLD AUTO: 15 % (ref 4–12)
NEUTROPHILS # BLD AUTO: 3.05 THOUSANDS/ΜL (ref 1.85–7.62)
NEUTS SEG NFR BLD AUTO: 62 % (ref 43–75)
NRBC BLD AUTO-RTO: 0 /100 WBCS
PLATELET # BLD AUTO: 273 THOUSANDS/UL (ref 149–390)
PMV BLD AUTO: 9.1 FL (ref 8.9–12.7)
POTASSIUM SERPL-SCNC: 4.6 MMOL/L (ref 3.5–5.3)
POTASSIUM SERPL-SCNC: 5.2 MMOL/L (ref 3.5–5.3)
PROT SERPL-MCNC: 6.7 G/DL (ref 6.4–8.2)
PROTHROMBIN TIME: 34.7 SECONDS (ref 11.6–14.5)
RBC # BLD AUTO: 3.45 MILLION/UL (ref 3.88–5.62)
SODIUM SERPL-SCNC: 124 MMOL/L (ref 136–145)
SODIUM SERPL-SCNC: 125 MMOL/L (ref 136–145)
TROPONIN I SERPL-MCNC: 0.03 NG/ML
URATE SERPL-MCNC: 11.5 MG/DL (ref 4.2–8)
WBC # BLD AUTO: 4.94 THOUSAND/UL (ref 4.31–10.16)

## 2020-10-09 PROCEDURE — 82948 REAGENT STRIP/BLOOD GLUCOSE: CPT

## 2020-10-09 PROCEDURE — 84300 ASSAY OF URINE SODIUM: CPT | Performed by: PHYSICIAN ASSISTANT

## 2020-10-09 PROCEDURE — 99223 1ST HOSP IP/OBS HIGH 75: CPT | Performed by: PHYSICIAN ASSISTANT

## 2020-10-09 PROCEDURE — 85730 THROMBOPLASTIN TIME PARTIAL: CPT | Performed by: PHYSICIAN ASSISTANT

## 2020-10-09 PROCEDURE — 80053 COMPREHEN METABOLIC PANEL: CPT | Performed by: EMERGENCY MEDICINE

## 2020-10-09 PROCEDURE — 80048 BASIC METABOLIC PNL TOTAL CA: CPT | Performed by: PHYSICIAN ASSISTANT

## 2020-10-09 PROCEDURE — 93005 ELECTROCARDIOGRAM TRACING: CPT

## 2020-10-09 PROCEDURE — 83690 ASSAY OF LIPASE: CPT | Performed by: EMERGENCY MEDICINE

## 2020-10-09 PROCEDURE — 84550 ASSAY OF BLOOD/URIC ACID: CPT | Performed by: PHYSICIAN ASSISTANT

## 2020-10-09 PROCEDURE — 36415 COLL VENOUS BLD VENIPUNCTURE: CPT

## 2020-10-09 PROCEDURE — 85610 PROTHROMBIN TIME: CPT | Performed by: PHYSICIAN ASSISTANT

## 2020-10-09 PROCEDURE — 99285 EMERGENCY DEPT VISIT HI MDM: CPT

## 2020-10-09 PROCEDURE — 84484 ASSAY OF TROPONIN QUANT: CPT | Performed by: PHYSICIAN ASSISTANT

## 2020-10-09 PROCEDURE — 83935 ASSAY OF URINE OSMOLALITY: CPT | Performed by: PHYSICIAN ASSISTANT

## 2020-10-09 PROCEDURE — 83930 ASSAY OF BLOOD OSMOLALITY: CPT | Performed by: PHYSICIAN ASSISTANT

## 2020-10-09 PROCEDURE — 85025 COMPLETE CBC W/AUTO DIFF WBC: CPT | Performed by: EMERGENCY MEDICINE

## 2020-10-09 PROCEDURE — 99285 EMERGENCY DEPT VISIT HI MDM: CPT | Performed by: PHYSICIAN ASSISTANT

## 2020-10-09 RX ORDER — ATORVASTATIN CALCIUM 40 MG/1
40 TABLET, FILM COATED ORAL DAILY
Status: DISCONTINUED | OUTPATIENT
Start: 2020-10-10 | End: 2020-10-13 | Stop reason: HOSPADM

## 2020-10-09 RX ORDER — METOPROLOL TARTRATE 50 MG/1
50 TABLET, FILM COATED ORAL 2 TIMES DAILY
Status: DISCONTINUED | OUTPATIENT
Start: 2020-10-09 | End: 2020-10-13 | Stop reason: HOSPADM

## 2020-10-09 RX ORDER — DOCUSATE SODIUM 100 MG/1
100 CAPSULE, LIQUID FILLED ORAL 2 TIMES DAILY PRN
Status: DISCONTINUED | OUTPATIENT
Start: 2020-10-09 | End: 2020-10-13 | Stop reason: HOSPADM

## 2020-10-09 RX ORDER — ACETAMINOPHEN 325 MG/1
650 TABLET ORAL EVERY 6 HOURS PRN
Status: DISCONTINUED | OUTPATIENT
Start: 2020-10-09 | End: 2020-10-13 | Stop reason: HOSPADM

## 2020-10-09 RX ORDER — MAGNESIUM HYDROXIDE/ALUMINUM HYDROXICE/SIMETHICONE 120; 1200; 1200 MG/30ML; MG/30ML; MG/30ML
30 SUSPENSION ORAL EVERY 6 HOURS PRN
Status: DISCONTINUED | OUTPATIENT
Start: 2020-10-09 | End: 2020-10-13 | Stop reason: HOSPADM

## 2020-10-09 RX ADMIN — METOPROLOL TARTRATE 50 MG: 50 TABLET, FILM COATED ORAL at 22:47

## 2020-10-09 RX ADMIN — SODIUM CHLORIDE 250 ML: 0.9 INJECTION, SOLUTION INTRAVENOUS at 23:35

## 2020-10-10 LAB
ANION GAP SERPL CALCULATED.3IONS-SCNC: 8 MMOL/L (ref 4–13)
APTT PPP: 59 SECONDS (ref 23–37)
BACTERIA UR QL AUTO: ABNORMAL /HPF
BILIRUB UR QL STRIP: NEGATIVE
BUN SERPL-MCNC: 43 MG/DL (ref 5–25)
BUN SERPL-MCNC: 46 MG/DL (ref 5–25)
BUN SERPL-MCNC: 46 MG/DL (ref 5–25)
CALCIUM SERPL-MCNC: 8.5 MG/DL (ref 8.3–10.1)
CALCIUM SERPL-MCNC: 8.5 MG/DL (ref 8.3–10.1)
CALCIUM SERPL-MCNC: 8.6 MG/DL (ref 8.3–10.1)
CHLORIDE SERPL-SCNC: 92 MMOL/L (ref 100–108)
CHLORIDE SERPL-SCNC: 92 MMOL/L (ref 100–108)
CHLORIDE SERPL-SCNC: 94 MMOL/L (ref 100–108)
CLARITY UR: CLEAR
CO2 SERPL-SCNC: 24 MMOL/L (ref 21–32)
COLOR UR: YELLOW
CORTIS SERPL-MCNC: 24.4 UG/DL
CREAT SERPL-MCNC: 2.97 MG/DL (ref 0.6–1.3)
CREAT SERPL-MCNC: 3.15 MG/DL (ref 0.6–1.3)
CREAT SERPL-MCNC: 3.16 MG/DL (ref 0.6–1.3)
ERYTHROCYTE [DISTWIDTH] IN BLOOD BY AUTOMATED COUNT: 16.6 % (ref 11.6–15.1)
GFR SERPL CREATININE-BSD FRML MDRD: 17 ML/MIN/1.73SQ M
GFR SERPL CREATININE-BSD FRML MDRD: 18 ML/MIN/1.73SQ M
GFR SERPL CREATININE-BSD FRML MDRD: 19 ML/MIN/1.73SQ M
GLUCOSE SERPL-MCNC: 120 MG/DL (ref 65–140)
GLUCOSE SERPL-MCNC: 141 MG/DL (ref 65–140)
GLUCOSE SERPL-MCNC: 153 MG/DL (ref 65–140)
GLUCOSE SERPL-MCNC: 154 MG/DL (ref 65–140)
GLUCOSE SERPL-MCNC: 172 MG/DL (ref 65–140)
GLUCOSE SERPL-MCNC: 177 MG/DL (ref 65–140)
GLUCOSE SERPL-MCNC: 177 MG/DL (ref 65–140)
GLUCOSE SERPL-MCNC: 179 MG/DL (ref 65–140)
GLUCOSE UR STRIP-MCNC: NEGATIVE MG/DL
HCT VFR BLD AUTO: 32.2 % (ref 36.5–49.3)
HGB BLD-MCNC: 10.3 G/DL (ref 12–17)
HGB UR QL STRIP.AUTO: NEGATIVE
INR PPP: 3 (ref 0.84–1.19)
INR PPP: 3.11 (ref 0.84–1.19)
KETONES UR STRIP-MCNC: NEGATIVE MG/DL
LEUKOCYTE ESTERASE UR QL STRIP: NEGATIVE
MCH RBC QN AUTO: 31.8 PG (ref 26.8–34.3)
MCHC RBC AUTO-ENTMCNC: 32 G/DL (ref 31.4–37.4)
MCV RBC AUTO: 99 FL (ref 82–98)
NITRITE UR QL STRIP: NEGATIVE
NON-SQ EPI CELLS URNS QL MICRO: ABNORMAL /HPF
OSMOLALITY UR/SERPL-RTO: 288 MMOL/KG (ref 282–298)
OSMOLALITY UR: 256 MMOL/KG
PH UR STRIP.AUTO: 5.5 [PH]
PLATELET # BLD AUTO: 241 THOUSANDS/UL (ref 149–390)
PMV BLD AUTO: 9.3 FL (ref 8.9–12.7)
POTASSIUM SERPL-SCNC: 4.4 MMOL/L (ref 3.5–5.3)
POTASSIUM SERPL-SCNC: 4.9 MMOL/L (ref 3.5–5.3)
POTASSIUM SERPL-SCNC: 4.9 MMOL/L (ref 3.5–5.3)
PROT UR STRIP-MCNC: ABNORMAL MG/DL
PROTHROMBIN TIME: 31.1 SECONDS (ref 11.6–14.5)
PROTHROMBIN TIME: 32.1 SECONDS (ref 11.6–14.5)
RBC # BLD AUTO: 3.24 MILLION/UL (ref 3.88–5.62)
RBC #/AREA URNS AUTO: ABNORMAL /HPF
SODIUM 24H UR-SCNC: 6 MOL/L
SODIUM SERPL-SCNC: 124 MMOL/L (ref 136–145)
SODIUM SERPL-SCNC: 124 MMOL/L (ref 136–145)
SODIUM SERPL-SCNC: 126 MMOL/L (ref 136–145)
SP GR UR STRIP.AUTO: 1.02 (ref 1–1.03)
UROBILINOGEN UR QL STRIP.AUTO: 0.2 E.U./DL
WBC # BLD AUTO: 4 THOUSAND/UL (ref 4.31–10.16)
WBC #/AREA URNS AUTO: ABNORMAL /HPF

## 2020-10-10 PROCEDURE — 82533 TOTAL CORTISOL: CPT | Performed by: INTERNAL MEDICINE

## 2020-10-10 PROCEDURE — 85027 COMPLETE CBC AUTOMATED: CPT | Performed by: INTERNAL MEDICINE

## 2020-10-10 PROCEDURE — 97162 PT EVAL MOD COMPLEX 30 MIN: CPT | Performed by: PHYSICAL THERAPIST

## 2020-10-10 PROCEDURE — 99223 1ST HOSP IP/OBS HIGH 75: CPT | Performed by: INTERNAL MEDICINE

## 2020-10-10 PROCEDURE — 81001 URINALYSIS AUTO W/SCOPE: CPT | Performed by: INTERNAL MEDICINE

## 2020-10-10 PROCEDURE — 82948 REAGENT STRIP/BLOOD GLUCOSE: CPT

## 2020-10-10 PROCEDURE — 99232 SBSQ HOSP IP/OBS MODERATE 35: CPT | Performed by: INTERNAL MEDICINE

## 2020-10-10 PROCEDURE — 85610 PROTHROMBIN TIME: CPT | Performed by: PSYCHIATRY & NEUROLOGY

## 2020-10-10 PROCEDURE — 80048 BASIC METABOLIC PNL TOTAL CA: CPT | Performed by: PHYSICIAN ASSISTANT

## 2020-10-10 RX ORDER — ALBUMIN (HUMAN) 12.5 G/50ML
12.5 SOLUTION INTRAVENOUS ONCE
Status: COMPLETED | OUTPATIENT
Start: 2020-10-10 | End: 2020-10-10

## 2020-10-10 RX ORDER — SODIUM CHLORIDE 1000 MG
3 TABLET, SOLUBLE MISCELLANEOUS
Status: DISCONTINUED | OUTPATIENT
Start: 2020-10-10 | End: 2020-10-13

## 2020-10-10 RX ORDER — SODIUM CHLORIDE 9 MG/ML
50 INJECTION, SOLUTION INTRAVENOUS CONTINUOUS
Status: DISCONTINUED | OUTPATIENT
Start: 2020-10-10 | End: 2020-10-10

## 2020-10-10 RX ADMIN — INSULIN LISPRO 1 UNITS: 100 INJECTION, SOLUTION INTRAVENOUS; SUBCUTANEOUS at 12:44

## 2020-10-10 RX ADMIN — ATORVASTATIN CALCIUM 40 MG: 40 TABLET, FILM COATED ORAL at 08:12

## 2020-10-10 RX ADMIN — ALBUMIN (HUMAN) 12.5 G: 0.25 INJECTION, SOLUTION INTRAVENOUS at 03:36

## 2020-10-10 RX ADMIN — SODIUM CHLORIDE 50 ML/HR: 0.9 INJECTION, SOLUTION INTRAVENOUS at 03:58

## 2020-10-10 RX ADMIN — INSULIN LISPRO 1 UNITS: 100 INJECTION, SOLUTION INTRAVENOUS; SUBCUTANEOUS at 08:12

## 2020-10-10 RX ADMIN — INSULIN LISPRO 1 UNITS: 100 INJECTION, SOLUTION INTRAVENOUS; SUBCUTANEOUS at 17:41

## 2020-10-10 RX ADMIN — ACETAMINOPHEN 650 MG: 325 TABLET, FILM COATED ORAL at 03:50

## 2020-10-10 RX ADMIN — INSULIN LISPRO 1 UNITS: 100 INJECTION, SOLUTION INTRAVENOUS; SUBCUTANEOUS at 22:52

## 2020-10-10 RX ADMIN — METOPROLOL TARTRATE 50 MG: 50 TABLET, FILM COATED ORAL at 17:46

## 2020-10-10 RX ADMIN — Medication 3 G: at 17:46

## 2020-10-11 ENCOUNTER — APPOINTMENT (INPATIENT)
Dept: CT IMAGING | Facility: HOSPITAL | Age: 80
DRG: 644 | End: 2020-10-11
Payer: MEDICARE

## 2020-10-11 LAB
ANION GAP SERPL CALCULATED.3IONS-SCNC: 7 MMOL/L (ref 4–13)
BASOPHILS # BLD AUTO: 0.03 THOUSANDS/ΜL (ref 0–0.1)
BASOPHILS NFR BLD AUTO: 1 % (ref 0–1)
BUN SERPL-MCNC: 44 MG/DL (ref 5–25)
CALCIUM SERPL-MCNC: 8.4 MG/DL (ref 8.3–10.1)
CHLORIDE SERPL-SCNC: 95 MMOL/L (ref 100–108)
CO2 SERPL-SCNC: 25 MMOL/L (ref 21–32)
CORTIS SERPL-MCNC: 22.4 UG/DL
CREAT SERPL-MCNC: 2.9 MG/DL (ref 0.6–1.3)
EOSINOPHIL # BLD AUTO: 0.08 THOUSAND/ΜL (ref 0–0.61)
EOSINOPHIL NFR BLD AUTO: 2 % (ref 0–6)
ERYTHROCYTE [DISTWIDTH] IN BLOOD BY AUTOMATED COUNT: 16.8 % (ref 11.6–15.1)
GFR SERPL CREATININE-BSD FRML MDRD: 19 ML/MIN/1.73SQ M
GLUCOSE SERPL-MCNC: 122 MG/DL (ref 65–140)
GLUCOSE SERPL-MCNC: 124 MG/DL (ref 65–140)
GLUCOSE SERPL-MCNC: 131 MG/DL (ref 65–140)
GLUCOSE SERPL-MCNC: 133 MG/DL (ref 65–140)
GLUCOSE SERPL-MCNC: 153 MG/DL (ref 65–140)
HCT VFR BLD AUTO: 34.5 % (ref 36.5–49.3)
HGB BLD-MCNC: 11.2 G/DL (ref 12–17)
IMM GRANULOCYTES # BLD AUTO: 0.02 THOUSAND/UL (ref 0–0.2)
IMM GRANULOCYTES NFR BLD AUTO: 0 % (ref 0–2)
INR PPP: 2.92 (ref 0.84–1.19)
LYMPHOCYTES # BLD AUTO: 1.2 THOUSANDS/ΜL (ref 0.6–4.47)
LYMPHOCYTES NFR BLD AUTO: 23 % (ref 14–44)
MAGNESIUM SERPL-MCNC: 1.8 MG/DL (ref 1.6–2.6)
MCH RBC QN AUTO: 32.5 PG (ref 26.8–34.3)
MCHC RBC AUTO-ENTMCNC: 32.5 G/DL (ref 31.4–37.4)
MCV RBC AUTO: 100 FL (ref 82–98)
MONOCYTES # BLD AUTO: 0.48 THOUSAND/ΜL (ref 0.17–1.22)
MONOCYTES NFR BLD AUTO: 9 % (ref 4–12)
NEUTROPHILS # BLD AUTO: 3.37 THOUSANDS/ΜL (ref 1.85–7.62)
NEUTS SEG NFR BLD AUTO: 65 % (ref 43–75)
NRBC BLD AUTO-RTO: 0 /100 WBCS
OSMOLALITY UR/SERPL-RTO: 285 MMOL/KG (ref 282–298)
PHOSPHATE SERPL-MCNC: 3.4 MG/DL (ref 2.3–4.1)
PLATELET # BLD AUTO: 253 THOUSANDS/UL (ref 149–390)
PMV BLD AUTO: 9.1 FL (ref 8.9–12.7)
POTASSIUM SERPL-SCNC: 4.7 MMOL/L (ref 3.5–5.3)
PROTHROMBIN TIME: 30.6 SECONDS (ref 11.6–14.5)
RBC # BLD AUTO: 3.45 MILLION/UL (ref 3.88–5.62)
SODIUM SERPL-SCNC: 127 MMOL/L (ref 136–145)
T4 FREE SERPL-MCNC: 1.22 NG/DL (ref 0.76–1.46)
TSH SERPL DL<=0.05 MIU/L-ACNC: 3.07 UIU/ML (ref 0.36–3.74)
URATE SERPL-MCNC: 11.1 MG/DL (ref 4.2–8)
WBC # BLD AUTO: 5.18 THOUSAND/UL (ref 4.31–10.16)

## 2020-10-11 PROCEDURE — 83735 ASSAY OF MAGNESIUM: CPT | Performed by: INTERNAL MEDICINE

## 2020-10-11 PROCEDURE — G1004 CDSM NDSC: HCPCS

## 2020-10-11 PROCEDURE — 82533 TOTAL CORTISOL: CPT | Performed by: INTERNAL MEDICINE

## 2020-10-11 PROCEDURE — 80048 BASIC METABOLIC PNL TOTAL CA: CPT | Performed by: PHYSICIAN ASSISTANT

## 2020-10-11 PROCEDURE — 74176 CT ABD & PELVIS W/O CONTRAST: CPT

## 2020-10-11 PROCEDURE — 84439 ASSAY OF FREE THYROXINE: CPT | Performed by: INTERNAL MEDICINE

## 2020-10-11 PROCEDURE — 84100 ASSAY OF PHOSPHORUS: CPT | Performed by: INTERNAL MEDICINE

## 2020-10-11 PROCEDURE — 85025 COMPLETE CBC W/AUTO DIFF WBC: CPT | Performed by: INTERNAL MEDICINE

## 2020-10-11 PROCEDURE — 99232 SBSQ HOSP IP/OBS MODERATE 35: CPT | Performed by: INTERNAL MEDICINE

## 2020-10-11 PROCEDURE — 71250 CT THORAX DX C-: CPT

## 2020-10-11 PROCEDURE — 85610 PROTHROMBIN TIME: CPT | Performed by: INTERNAL MEDICINE

## 2020-10-11 PROCEDURE — 84550 ASSAY OF BLOOD/URIC ACID: CPT | Performed by: INTERNAL MEDICINE

## 2020-10-11 PROCEDURE — 83930 ASSAY OF BLOOD OSMOLALITY: CPT | Performed by: INTERNAL MEDICINE

## 2020-10-11 PROCEDURE — 82948 REAGENT STRIP/BLOOD GLUCOSE: CPT

## 2020-10-11 PROCEDURE — 84443 ASSAY THYROID STIM HORMONE: CPT | Performed by: INTERNAL MEDICINE

## 2020-10-11 RX ORDER — WARFARIN SODIUM 2.5 MG/1
2.5 TABLET ORAL
Status: DISCONTINUED | OUTPATIENT
Start: 2020-10-11 | End: 2020-10-13 | Stop reason: HOSPADM

## 2020-10-11 RX ORDER — MAGNESIUM SULFATE HEPTAHYDRATE 40 MG/ML
2 INJECTION, SOLUTION INTRAVENOUS ONCE
Status: COMPLETED | OUTPATIENT
Start: 2020-10-11 | End: 2020-10-11

## 2020-10-11 RX ADMIN — Medication 3 G: at 12:55

## 2020-10-11 RX ADMIN — ATORVASTATIN CALCIUM 40 MG: 40 TABLET, FILM COATED ORAL at 08:14

## 2020-10-11 RX ADMIN — MAGNESIUM SULFATE HEPTAHYDRATE 2 G: 40 INJECTION, SOLUTION INTRAVENOUS at 16:29

## 2020-10-11 RX ADMIN — WARFARIN SODIUM 2.5 MG: 2.5 TABLET ORAL at 18:04

## 2020-10-11 RX ADMIN — METOPROLOL TARTRATE 50 MG: 50 TABLET, FILM COATED ORAL at 18:04

## 2020-10-11 RX ADMIN — Medication 3 G: at 08:14

## 2020-10-11 RX ADMIN — ACETAMINOPHEN 650 MG: 325 TABLET, FILM COATED ORAL at 19:06

## 2020-10-11 RX ADMIN — Medication 3 G: at 16:29

## 2020-10-11 RX ADMIN — METOPROLOL TARTRATE 50 MG: 50 TABLET, FILM COATED ORAL at 08:14

## 2020-10-12 PROBLEM — R93.89 ABNORMAL FINDING ON CT SCAN: Status: ACTIVE | Noted: 2020-10-12

## 2020-10-12 PROBLEM — R59.0 MEDIASTINAL LYMPHADENOPATHY: Status: ACTIVE | Noted: 2020-10-12

## 2020-10-12 LAB
ANION GAP SERPL CALCULATED.3IONS-SCNC: 11 MMOL/L (ref 4–13)
ATRIAL RATE: 70 BPM
BUN SERPL-MCNC: 41 MG/DL (ref 5–25)
CALCIUM SERPL-MCNC: 8.3 MG/DL (ref 8.3–10.1)
CHLORIDE SERPL-SCNC: 97 MMOL/L (ref 100–108)
CO2 SERPL-SCNC: 23 MMOL/L (ref 21–32)
CREAT SERPL-MCNC: 2.58 MG/DL (ref 0.6–1.3)
GFR SERPL CREATININE-BSD FRML MDRD: 22 ML/MIN/1.73SQ M
GLUCOSE SERPL-MCNC: 119 MG/DL (ref 65–140)
GLUCOSE SERPL-MCNC: 126 MG/DL (ref 65–140)
GLUCOSE SERPL-MCNC: 129 MG/DL (ref 65–140)
GLUCOSE SERPL-MCNC: 129 MG/DL (ref 65–140)
GLUCOSE SERPL-MCNC: 155 MG/DL (ref 65–140)
GLUCOSE SERPL-MCNC: 214 MG/DL (ref 65–140)
INR PPP: 2.64 (ref 0.84–1.19)
MAGNESIUM SERPL-MCNC: 2.4 MG/DL (ref 1.6–2.6)
POTASSIUM SERPL-SCNC: 4.4 MMOL/L (ref 3.5–5.3)
PROTHROMBIN TIME: 28.3 SECONDS (ref 11.6–14.5)
QRS AXIS: 230 DEGREES
QRSD INTERVAL: 142 MS
QT INTERVAL: 464 MS
QTC INTERVAL: 490 MS
SODIUM SERPL-SCNC: 131 MMOL/L (ref 136–145)
T WAVE AXIS: -34 DEGREES
VENTRICULAR RATE: 67 BPM

## 2020-10-12 PROCEDURE — 99232 SBSQ HOSP IP/OBS MODERATE 35: CPT | Performed by: INTERNAL MEDICINE

## 2020-10-12 PROCEDURE — 93010 ELECTROCARDIOGRAM REPORT: CPT | Performed by: INTERNAL MEDICINE

## 2020-10-12 PROCEDURE — 86334 IMMUNOFIX E-PHORESIS SERUM: CPT | Performed by: PSYCHIATRY & NEUROLOGY

## 2020-10-12 PROCEDURE — 86334 IMMUNOFIX E-PHORESIS SERUM: CPT | Performed by: PATHOLOGY

## 2020-10-12 PROCEDURE — 83883 ASSAY NEPHELOMETRY NOT SPEC: CPT | Performed by: PSYCHIATRY & NEUROLOGY

## 2020-10-12 PROCEDURE — 90662 IIV NO PRSV INCREASED AG IM: CPT | Performed by: INTERNAL MEDICINE

## 2020-10-12 PROCEDURE — 82948 REAGENT STRIP/BLOOD GLUCOSE: CPT

## 2020-10-12 PROCEDURE — 84165 PROTEIN E-PHORESIS SERUM: CPT | Performed by: PATHOLOGY

## 2020-10-12 PROCEDURE — 83735 ASSAY OF MAGNESIUM: CPT | Performed by: INTERNAL MEDICINE

## 2020-10-12 PROCEDURE — G0008 ADMIN INFLUENZA VIRUS VAC: HCPCS | Performed by: INTERNAL MEDICINE

## 2020-10-12 PROCEDURE — 85610 PROTHROMBIN TIME: CPT | Performed by: INTERNAL MEDICINE

## 2020-10-12 PROCEDURE — 80048 BASIC METABOLIC PNL TOTAL CA: CPT | Performed by: INTERNAL MEDICINE

## 2020-10-12 PROCEDURE — 84165 PROTEIN E-PHORESIS SERUM: CPT | Performed by: PSYCHIATRY & NEUROLOGY

## 2020-10-12 RX ADMIN — METOPROLOL TARTRATE 50 MG: 50 TABLET, FILM COATED ORAL at 18:13

## 2020-10-12 RX ADMIN — INFLUENZA A VIRUS A/MICHIGAN/45/2015 X-275 (H1N1) ANTIGEN (FORMALDEHYDE INACTIVATED), INFLUENZA A VIRUS A/SINGAPORE/INFIMH-16-0019/2016 IVR-186 (H3N2) ANTIGEN (FORMALDEHYDE INACTIVATED), INFLUENZA B VIRUS B/PHUKET/3073/2013 ANTIGEN (FORMALDEHYDE INACTIVATED), AND INFLUENZA B VIRUS B/MARYLAND/15/2016 BX-69A ANTIGEN (FORMALDEHYDE INACTIVATED) 0.7 ML: 60; 60; 60; 60 INJECTION, SUSPENSION INTRAMUSCULAR at 12:39

## 2020-10-12 RX ADMIN — Medication 3 G: at 12:37

## 2020-10-12 RX ADMIN — ACETAMINOPHEN 650 MG: 325 TABLET, FILM COATED ORAL at 15:53

## 2020-10-12 RX ADMIN — ATORVASTATIN CALCIUM 40 MG: 40 TABLET, FILM COATED ORAL at 08:56

## 2020-10-12 RX ADMIN — INSULIN LISPRO 2 UNITS: 100 INJECTION, SOLUTION INTRAVENOUS; SUBCUTANEOUS at 12:37

## 2020-10-12 RX ADMIN — Medication 3 G: at 18:12

## 2020-10-12 RX ADMIN — Medication 3 G: at 08:56

## 2020-10-12 RX ADMIN — WARFARIN SODIUM 2.5 MG: 2.5 TABLET ORAL at 18:12

## 2020-10-12 RX ADMIN — METOPROLOL TARTRATE 50 MG: 50 TABLET, FILM COATED ORAL at 08:56

## 2020-10-13 VITALS
RESPIRATION RATE: 20 BRPM | OXYGEN SATURATION: 92 % | BODY MASS INDEX: 34.2 KG/M2 | WEIGHT: 212.8 LBS | DIASTOLIC BLOOD PRESSURE: 62 MMHG | TEMPERATURE: 98 F | HEART RATE: 69 BPM | HEIGHT: 66 IN | SYSTOLIC BLOOD PRESSURE: 123 MMHG

## 2020-10-13 LAB
ANION GAP SERPL CALCULATED.3IONS-SCNC: 10 MMOL/L (ref 4–13)
BUN SERPL-MCNC: 43 MG/DL (ref 5–25)
CALCIUM SERPL-MCNC: 8.8 MG/DL (ref 8.3–10.1)
CHLORIDE SERPL-SCNC: 101 MMOL/L (ref 100–108)
CO2 SERPL-SCNC: 23 MMOL/L (ref 21–32)
CREAT SERPL-MCNC: 2.51 MG/DL (ref 0.6–1.3)
GFR SERPL CREATININE-BSD FRML MDRD: 23 ML/MIN/1.73SQ M
GLUCOSE SERPL-MCNC: 128 MG/DL (ref 65–140)
GLUCOSE SERPL-MCNC: 148 MG/DL (ref 65–140)
GLUCOSE SERPL-MCNC: 162 MG/DL (ref 65–140)
GLUCOSE SERPL-MCNC: 214 MG/DL (ref 65–140)
KAPPA LC FREE SER-MCNC: 259.9 MG/L (ref 3.3–19.4)
KAPPA LC FREE/LAMBDA FREE SER: 7.51 {RATIO} (ref 0.26–1.65)
LAMBDA LC FREE SERPL-MCNC: 34.6 MG/L (ref 5.7–26.3)
POTASSIUM SERPL-SCNC: 4.5 MMOL/L (ref 3.5–5.3)
SODIUM SERPL-SCNC: 134 MMOL/L (ref 136–145)

## 2020-10-13 PROCEDURE — 99232 SBSQ HOSP IP/OBS MODERATE 35: CPT | Performed by: INTERNAL MEDICINE

## 2020-10-13 PROCEDURE — 99239 HOSP IP/OBS DSCHRG MGMT >30: CPT | Performed by: INTERNAL MEDICINE

## 2020-10-13 PROCEDURE — 82948 REAGENT STRIP/BLOOD GLUCOSE: CPT

## 2020-10-13 PROCEDURE — 80048 BASIC METABOLIC PNL TOTAL CA: CPT | Performed by: INTERNAL MEDICINE

## 2020-10-13 RX ORDER — TORSEMIDE 10 MG/1
10 TABLET ORAL DAILY
Status: DISCONTINUED | OUTPATIENT
Start: 2020-10-13 | End: 2020-10-13 | Stop reason: HOSPADM

## 2020-10-13 RX ORDER — SODIUM CHLORIDE 1000 MG
2 TABLET, SOLUBLE MISCELLANEOUS
Qty: 75 TABLET | Refills: 0 | Status: SHIPPED | OUTPATIENT
Start: 2020-10-13

## 2020-10-13 RX ORDER — SODIUM CHLORIDE 1000 MG
2 TABLET, SOLUBLE MISCELLANEOUS
Status: DISCONTINUED | OUTPATIENT
Start: 2020-10-13 | End: 2020-10-13 | Stop reason: HOSPADM

## 2020-10-13 RX ADMIN — ACETAMINOPHEN 650 MG: 325 TABLET, FILM COATED ORAL at 12:14

## 2020-10-13 RX ADMIN — ATORVASTATIN CALCIUM 40 MG: 40 TABLET, FILM COATED ORAL at 08:41

## 2020-10-13 RX ADMIN — METOPROLOL TARTRATE 50 MG: 50 TABLET, FILM COATED ORAL at 08:41

## 2020-10-13 RX ADMIN — TORSEMIDE 10 MG: 10 TABLET ORAL at 16:44

## 2020-10-13 RX ADMIN — Medication 3 G: at 12:15

## 2020-10-13 RX ADMIN — Medication 3 G: at 08:41

## 2020-10-13 RX ADMIN — INSULIN LISPRO 2 UNITS: 100 INJECTION, SOLUTION INTRAVENOUS; SUBCUTANEOUS at 12:16

## 2020-10-14 LAB
ALBUMIN SERPL ELPH-MCNC: 3.35 G/DL (ref 3.5–5)
ALBUMIN SERPL ELPH-MCNC: 56.8 % (ref 52–65)
ALPHA1 GLOB SERPL ELPH-MCNC: 0.49 G/DL (ref 0.1–0.4)
ALPHA1 GLOB SERPL ELPH-MCNC: 8.3 % (ref 2.5–5)
ALPHA2 GLOB SERPL ELPH-MCNC: 0.5 G/DL (ref 0.4–1.2)
ALPHA2 GLOB SERPL ELPH-MCNC: 8.4 % (ref 7–13)
BETA GLOB ABNORMAL SERPL ELPH-MCNC: 0.44 G/DL (ref 0.4–0.8)
BETA1 GLOB SERPL ELPH-MCNC: 7.4 % (ref 5–13)
BETA2 GLOB SERPL ELPH-MCNC: 4 % (ref 2–8)
BETA2+GAMMA GLOB SERPL ELPH-MCNC: 0.24 G/DL (ref 0.2–0.5)
GAMMA GLOB ABNORMAL SERPL ELPH-MCNC: 0.89 G/DL (ref 0.5–1.6)
GAMMA GLOB SERPL ELPH-MCNC: 15.1 % (ref 12–22)
IGG/ALB SER: 1.31 {RATIO} (ref 1.1–1.8)
INTERPRETATION UR IFE-IMP: NORMAL
M PROTEIN 1 MFR SERPL ELPH: 5.2 %
M PROTEIN 1 SERPL ELPH-MCNC: 0.31 G/DL
PROT PATTERN SERPL ELPH-IMP: ABNORMAL
PROT SERPL-MCNC: 5.9 G/DL (ref 6.4–8.2)

## 2020-10-15 ENCOUNTER — TELEPHONE (OUTPATIENT)
Dept: OTHER | Facility: HOSPITAL | Age: 80
End: 2020-10-15

## 2020-10-23 LAB — MISCELLANEOUS LAB TEST RESULT: NORMAL
